# Patient Record
Sex: FEMALE | Race: WHITE | Employment: FULL TIME | URBAN - METROPOLITAN AREA
[De-identification: names, ages, dates, MRNs, and addresses within clinical notes are randomized per-mention and may not be internally consistent; named-entity substitution may affect disease eponyms.]

---

## 2017-04-12 DIAGNOSIS — B00.9 HSV (HERPES SIMPLEX VIRUS) INFECTION: ICD-10-CM

## 2017-04-12 NOTE — TELEPHONE ENCOUNTER
Reason for Call:  Medication or medication refill:    Do you use a [facility]?  Name of the pharmacy and phone number for the current request:  [facility] [address], [facility] [phone]    Name of the medication requested: Valtrex 500mg     Other request: Patient did schedule an appointment on 5/11 with Maegan Larkin for annual pap.     Can we leave a detailed message on this number? YES    Phone number patient can be reached at: Home number on file [phone] (home)    Best Time: anytime    Call taken on 4/12/2017 at 10:44 AM by Nikki Viveros

## 2017-04-14 RX ORDER — VALACYCLOVIR HYDROCHLORIDE 500 MG/1
500 TABLET, FILM COATED ORAL 2 TIMES DAILY
Qty: 24 TABLET | Refills: 0 | Status: SHIPPED | OUTPATIENT
Start: 2017-04-14 | End: 2017-05-11

## 2017-04-14 NOTE — TELEPHONE ENCOUNTER
Prescription approved per [facility].     Diana Castillo RN

## 2017-05-11 ENCOUNTER — OFFICE VISIT (OUTPATIENT)
Dept: OBGYN | Facility: CLINIC | Age: 52
End: 2017-05-11
Payer: COMMERCIAL

## 2017-05-11 VITALS
HEIGHT: 63 IN | DIASTOLIC BLOOD PRESSURE: 74 MMHG | WEIGHT: 136 LBS | BODY MASS INDEX: 24.1 KG/M2 | SYSTOLIC BLOOD PRESSURE: 132 MMHG

## 2017-05-11 DIAGNOSIS — Z12.11 SPECIAL SCREENING FOR MALIGNANT NEOPLASMS, COLON: ICD-10-CM

## 2017-05-11 DIAGNOSIS — B00.9 HSV (HERPES SIMPLEX VIRUS) INFECTION: ICD-10-CM

## 2017-05-11 DIAGNOSIS — Z01.419 ENCOUNTER FOR GYNECOLOGICAL EXAMINATION WITHOUT ABNORMAL FINDING: Primary | ICD-10-CM

## 2017-05-11 PROCEDURE — 87624 HPV HI-RISK TYP POOLED RSLT: CPT | Performed by: OBSTETRICS & GYNECOLOGY

## 2017-05-11 PROCEDURE — 87491 CHLMYD TRACH DNA AMP PROBE: CPT | Performed by: OBSTETRICS & GYNECOLOGY

## 2017-05-11 PROCEDURE — G0124 SCREEN C/V THIN LAYER BY MD: HCPCS | Performed by: OBSTETRICS & GYNECOLOGY

## 2017-05-11 PROCEDURE — 88175 CYTOPATH C/V AUTO FLUID REDO: CPT | Performed by: OBSTETRICS & GYNECOLOGY

## 2017-05-11 PROCEDURE — 87591 N.GONORRHOEAE DNA AMP PROB: CPT | Performed by: OBSTETRICS & GYNECOLOGY

## 2017-05-11 PROCEDURE — 99396 PREV VISIT EST AGE 40-64: CPT | Performed by: OBSTETRICS & GYNECOLOGY

## 2017-05-11 RX ORDER — VALACYCLOVIR HYDROCHLORIDE 500 MG/1
500 TABLET, FILM COATED ORAL DAILY
Qty: 90 TABLET | Refills: 3 | Status: ON HOLD | OUTPATIENT
Start: 2017-05-11 | End: 2018-03-15

## 2017-05-11 NOTE — MR AVS SNAPSHOT
After Visit Summary   5/11/2017    Mariely Mark    MRN: [id number]           Patient Information     Date Of Birth          1965        Visit Information        Provider Department      5/11/2017 9:30 AM Maegan Larkin MD [facility]        Today's Diagnoses     Encounter for gynecological examination without abnormal finding    -  1    Special screening for malignant neoplasms, colon        HSV (herpes simplex virus) infection           Follow-ups after your visit        Additional Services     GASTROENTEROLOGY ADULT REF PROCEDURE ONLY       Last Lab Result: No results found for: CR  Body mass index is 23.9 kg/(m^2).     Needed:  No  Language:  English    Patient will be contacted to schedule procedure.     Please be aware that coverage of these services is subject to the terms and limitations of your health insurance plan.  Call member services at your health plan with any benefit or coverage questions.  Any procedures must be performed at a [facility] facility OR coordinated by your clinic's referral office.    Please bring the following with you to your appointment:    (1) Any X-Rays, CTs or MRIs which have been performed.  Contact the facility where they were done to arrange for  prior to your scheduled appointment.    (2) List of current medications   (3) This referral request   (4) Any documents/labs given to you for this referral                  Your next 10 appointments already scheduled     Jul 18, 2017   Procedure with Jeremy Moran MD   [facility] (--)    [address]  [facility] [address] [phone]              Who to contact     If you have questions or need follow up information about today's clinic visit or your schedule please contact [facility] directly at [phone].  Normal or non-critical lab and imaging results will be communicated to you by MyChart, letter or phone  "within 4 business days after the clinic has received the results. If you do not hear from us within 7 days, please contact the clinic through [employer] or phone. If you have a critical or abnormal lab result, we will notify you by phone as soon as possible.  Submit refill requests through [employer] or call your pharmacy and they will forward the refill request to us. Please allow 3 business days for your refill to be completed.          Additional Information About Your Visit        [employer] Information     [employer] gives you secure access to your electronic health record. If you see a primary care provider, you can also send messages to your care team and make appointments. If you have questions, please call your primary care clinic.  If you do not have a primary care provider, please call [phone] and they will assist you.        Care EveryWhere ID     This is your Care EveryWhere ID. This could be used by other organizations to access your [place] medical records  [id number]        Your Vitals Were     Height Last Period BMI (Body Mass Index)             5' 3.25\" (1.607 m) 04/16/2013 23.9 kg/m2          Blood Pressure from Last 3 Encounters:   05/11/17 132/74   06/25/15 116/62   04/16/15 131/76    Weight from Last 3 Encounters:   05/11/17 136 lb (61.7 kg)   06/25/15 141 lb (64 kg)   04/16/15 139 lb (63 kg)              We Performed the Following     CHLAMYDIA TRACHOMATIS PCR     GASTROENTEROLOGY ADULT REF PROCEDURE ONLY     HPV High Risk Types DNA Cervical     NEISSERIA GONORRHOEA PCR     Pap imaged thin layer screen with HPV - recommended age 30 - 65 years (select HPV order below)          Today's Medication Changes          These changes are accurate as of: 5/11/17 11:59 PM.  If you have any questions, ask your nurse or doctor.               These medicines have changed or have updated prescriptions.        Dose/Directions    valACYclovir 500 MG tablet   Commonly known as:  VALTREX   This may have changed:  "   - when to take this  - additional instructions   Used for:  HSV (herpes simplex virus) infection   Changed by:  Maegan Larkin MD        Dose:  500 mg   Take 1 tablet (500 mg) by mouth daily   Quantity:  90 tablet   Refills:  3            Where to get your medicines      These medications were sent to [id number] - [place] - [address] AT [facility] [address], [place] [address]     Phone:  [phone]     valACYclovir 500 MG tablet                Primary Care Provider Office Phone # Fax #    Cheyenne Snider PA-C [phone]       [facility] [address] [facility] [address]        Thank you!     Thank you for choosing [facility]  for your care. Our goal is always to provide you with excellent care. Hearing back from our patients is one way we can continue to improve our services. Please take a few minutes to complete the written survey that you may receive in the mail after your visit with us. Thank you!             Your Updated Medication List - Protect others around you: Learn how to safely use, store and throw away your medicines at www.disposemymeds.org.          This list is accurate as of: 5/11/17 11:59 PM.  Always use your most recent med list.                   Brand Name Dispense Instructions for use    valACYclovir 500 MG tablet    VALTREX    90 tablet    Take 1 tablet (500 mg) by mouth daily

## 2017-05-11 NOTE — NURSING NOTE
"Chief Complaint   Patient presents with     Physical     refill of herpes medication. tongue has been numb lately, wondering if you can look at it, she's going out of town for 10 days. hot flashes.        Initial /74  Ht 5' 3.25\" (1.607 m)  Wt 136 lb (61.7 kg)  LMP 2013  BMI 23.9 kg/m2 Estimated body mass index is 23.9 kg/(m^2) as calculated from the following:    Height as of this encounter: 5' 3.25\" (1.607 m).    Weight as of this encounter: 136 lb (61.7 kg).  BP completed using cuff size: regular        The following HM Due: mammogram  pap smear  colonoscopy/FIT      The following patient reported/Care Every where data was sent to:  P ABSTRACT QUALITY INITIATIVES [[id number]]       patient has appointment for today    Shanna Yousif CMA               "

## 2017-05-11 NOTE — PROGRESS NOTES
Mariely is a 51 year old  who presents for annual exam.   Postmenopausal since .  She is having hot flashes, mild. No vaginal bleeding noted.     Besides routine health maintenance,  she would like to discuss numb tongue and hotflashes.  Sweating through her clothes. Causing disruption in her sleep, work, and social life.   GYNECOLOGIC HISTORY:  Menarche: [age]   Age at first intercourse: 17 Number of lifetime partners: <6  She is sexually active with 1 male partner(s) and she is currently in monogamous relationship.    History sexually transmitted infections:No STD history  STI testing offered?  Accepted  Estrogen replacement therapy: No  SIMONA exposure: Unknown    History of abnormal Pap smear: YES - updated in Problem List and Health Maintenance accordingly  Family history of breast CA: No  Family history of uterine/ovarian CA: No  Family history of colon CA: No    HEALTH MAINTENANCE:  Cholesterol: (No components found for: CHOL2 ) History of abnormal lipids: No  Mammo: 2013 . History of abnormal Mammo: No  Regular Self Breast Exams: No  Colonoscopy:  History of abnormal Colonoscopy: No  Dexa:  History of abnormal Dexa: No  Calcium/Vitamin D intake: source:  dairy, dietary supplement(s), diet Adequate? Yes  TSH: (No components found for: TSH1 )  Pap; (  Lab Results   Component Value Date    PAP LSIL 2015    PAP LSIL 09/10/2014    PAP LSIL 2013    )    HISTORY:  Obstetric History       T0      TAB0   SAB0   E0   M0   L0         Past Medical History:   Diagnosis Date     H/O colposcopy with cervical biopsy 13    LSIL     LSIL (low grade squamous intraepithelial lesion) on Pap smear 13     LSIL (low grade squamous intraepithelial lesion) on Pap smear 9/10/14    + HR HPV     LSIL (low grade squamous intraepithelial lesion) on Pap smear 2/24/15    + HR HPV     S/P bunionectomy 2006    L foot     No past surgical history on file.  Family History   Problem Relation Age of  "Onset     Hypertension Mother      C.A.D. Father 58     MI, stents     Hypertension Father      C.A.D. Brother 50     MI     DIABETES No family hx of      Breast Cancer No family hx of      Cancer - colorectal No family hx of      Social History     Social History     Marital status: Single     Spouse name: N/A     Number of children: N/A     Years of education: N/A     Social History Main Topics     Smoking status: Never Smoker     Smokeless tobacco: Never Used     Alcohol use Yes      Comment: 1 time per week     Drug use: No     Sexual activity: Yes     Partners: Male     Other Topics Concern     Parent/Sibling W/ Cabg, Mi Or Angioplasty Before 65f 55m? Yes     Social History Narrative       Current Outpatient Prescriptions:      valACYclovir (VALTREX) 500 MG tablet, Take 1 tablet (500 mg) by mouth 2 times daily X 3 days, Disp: 24 tablet, Rfl: 0     Allergies   Allergen Reactions     Dye [Contrast Dye] Hives     Penicillins        Past medical, surgical, social and family history were reviewed and updated in EPIC.    ROS:   C:       NEGATIVE for fever, chills, change in weight  I:         NEGATIVE for worrisome rashes, moles or lesions  E:       NEGATIVE for vision changes or irritation  E/M:   NEGATIVE for ear, mouth and throat problems  R:       NEGATIVE for significant cough or SOB  CV:     NEGATIVE for chest pain, palpitations or peripheral edema  GI:      NEGATIVE for nausea, abdominal pain, heartburn, or change in bowel habits  :    NEGATIVE for frequency, dysuria, hematuria, vaginal discharge, or bleeding  M:       NEGATIVE for significant arthralgias or myalgia  N:       NEGATIVE for weakness, dizziness or paresthesias  E:       NEGATIVE for temperature intolerance, skin/hair changes  P:       NEGATIVE for changes in mood or affect    EXAM:  /74  Ht 5' 3.25\" (1.607 m)  Wt 136 lb (61.7 kg)  LMP 04/16/2013  BMI 23.9 kg/m2   BMI: Body mass index is 23.9 kg/(m^2).  Constitutional: healthy, alert " and no distress  Head: Normocephalic. No masses, lesions, tenderness or abnormalities  Neck: Neck supple. Trachea midline. No adenopathy. Thyroid symmetric, normal size.   Cardiovascular: RRR.   Respiratory: Negative.   Breast: Breasts reveal mild symmetric fibrocystic densities, but there are no dominant, discrete, fixed or suspicious masses found.  Gastrointestinal: Abdomen soft, non-tender, non-distended. No masses, organomegaly  :  Vulva:  No external lesions, normal female hair distribution, no inguinal adenopathy.    Urethra:  Midline, non-tender, well supported, no discharge  Vagina:  Atrophic, no abnormal discharge, no lesions  Uterus:  Normal size, anteverted , non-tender, freely mobile  Ovaries:  No masses appreciated, non-tender, mobile  Rectal Exam: deferred  Musculoskeletal: extremities normal  Skin: no suspicious lesions or rashes  Psychiatric: Affect appropriate, cooperative,mentation appears normal.     COUNSELING:   Reviewed preventive health counseling, as reflected in patient instructions       Sun protection       Regular exercise       Healthy diet/nutrition       (Pauline)menopause management   reports that she has never smoked. She has never used smokeless tobacco.    Body mass index is 23.9 kg/(m^2).      FRAX Risk Assessment  ASSESSMENT:  51 year old  with satisfactory annual exam  (Z01.419) Encounter for gynecological examination without abnormal finding  (primary encounter diagnosis)  Comment:   Plan: Pap imaged thin layer screen with HPV -         recommended age 30 - 65 years (select HPV order        below), HPV High Risk Types DNA Cervical, *MA         Screening Digital Bilateral, NEISSERIA         GONORRHOEA PCR, CHLAMYDIA TRACHOMATIS PCR   Patient may decide she wants to start effexor for hot flashes. Written information provided.    (Z12.11) Special screening for malignant neoplasms, colon  Comment:   Plan: GASTROENTEROLOGY ADULT REF PROCEDURE ONLY            (B00.9) HSV  (herpes simplex virus) infection  Comment:   Plan: valACYclovir (VALTREX) 500 MG tablet

## 2017-05-12 LAB
C TRACH DNA SPEC QL NAA+PROBE: NORMAL
N GONORRHOEA DNA SPEC QL NAA+PROBE: NORMAL
SPECIMEN SOURCE: NORMAL
SPECIMEN SOURCE: NORMAL

## 2017-05-16 LAB
COPATH REPORT: ABNORMAL
PAP: ABNORMAL

## 2017-05-17 LAB
FINAL DIAGNOSIS: ABNORMAL
HPV HR 12 DNA CVX QL NAA+PROBE: POSITIVE
HPV16 DNA SPEC QL NAA+PROBE: NEGATIVE
HPV18 DNA SPEC QL NAA+PROBE: NEGATIVE
SPECIMEN DESCRIPTION: ABNORMAL

## 2017-07-13 ENCOUNTER — OFFICE VISIT (OUTPATIENT)
Dept: OBGYN | Facility: CLINIC | Age: 52
End: 2017-07-13
Payer: COMMERCIAL

## 2017-07-13 VITALS
TEMPERATURE: 98.4 F | SYSTOLIC BLOOD PRESSURE: 112 MMHG | HEIGHT: 63 IN | BODY MASS INDEX: 26.19 KG/M2 | WEIGHT: 147.8 LBS | DIASTOLIC BLOOD PRESSURE: 66 MMHG

## 2017-07-13 DIAGNOSIS — R87.610 PAPANICOLAOU SMEAR OF CERVIX WITH ATYPICAL SQUAMOUS CELLS OF UNDETERMINED SIGNIFICANCE (ASC-US): Primary | ICD-10-CM

## 2017-07-13 DIAGNOSIS — R87.619 ENDOMETRIAL CELLS ON CERVICAL PAP SMEAR INCONSISTENT WITH LAST MENSTRUAL PERIOD: ICD-10-CM

## 2017-07-13 PROCEDURE — 58110 BX DONE W/COLPOSCOPY ADD-ON: CPT | Performed by: OBSTETRICS & GYNECOLOGY

## 2017-07-13 PROCEDURE — 88305 TISSUE EXAM BY PATHOLOGIST: CPT | Performed by: OBSTETRICS & GYNECOLOGY

## 2017-07-13 PROCEDURE — 88342 IMHCHEM/IMCYTCHM 1ST ANTB: CPT | Performed by: OBSTETRICS & GYNECOLOGY

## 2017-07-13 PROCEDURE — 57454 BX/CURETT OF CERVIX W/SCOPE: CPT | Performed by: OBSTETRICS & GYNECOLOGY

## 2017-07-13 NOTE — PROGRESS NOTES
Mariely Mark is a 51 year old female  who presents for initial colposcopy, referred by Dr. Larkin. Pap smear on 17 showed: ASCUS, with high risk HPV present: not 16/18 and endometrial cells. The prior pap showed LGSIL.       Patient's last menstrual period was 2013.  UPT today is not done  Patient does not smoke  Type of contraception: none  Age at first sexual intercourse: 17  Number of sexual partners (lifetime): <6  Past GYN history: Herpes and HPV  Prior cervical/vaginal disease: KRIS 1.  Prior cervical treatment: no treatment.      PROCEDURE:      Before the procedure, it was ensured that the patient was educated regarding the nature of her findings to date, the implications, and what was to be done. She has been made aware of the role of HPV, the natural history of infection, ways to minimize her future risk, the effect of HPV on the cervix, and treatment options available should they be indicated. The details of the colposcopic procedure were reviewed. All questions were answered before proceeding, and informed consent was therefore obtained.      Speculum placed in vagina and excellent visualization of cervix acheived, cervix swabbed x 3 with acetic acid solution.      FINDINGS:  Cervix: HPV effect at 6:00, biopsy taken  SCJ seen?: no     Procedure: Endometrial biopsy.   After informed consent was obtained, the cervix was cleansed with betadyne, grasped with a tenaculum.  The pipel was inserted easily and four quadrant sampling was done to very scant return of tissue.  Sample was sent for pathology.    Tenaculum removed, hemostasis achieved with simple pressure, minimal bleeding. Patient tolerated procedure well.    ECC done?: Yes   Satisfactory examination?: no      ASSESSMENT: HPV related changes.  PLAN: specimens labelled and sent to Pathology, will base further treatment on Pathology findings, post biopsy instructions given to patient and call to discuss Pathology results      Maegan  Chaya ASHLEY

## 2017-07-13 NOTE — NURSING NOTE
"Chief Complaint   Patient presents with     Colposcopy     Gyn Exam     EMB        Initial /66  Temp 98.4  F (36.9  C) (Oral)  Ht 5' 3.25\" (1.607 m)  Wt 147 lb 12.8 oz (67 kg)  LMP 2013  BMI 25.98 kg/m2 Estimated body mass index is 25.98 kg/(m^2) as calculated from the following:    Height as of this encounter: 5' 3.25\" (1.607 m).    Weight as of this encounter: 147 lb 12.8 oz (67 kg).  BP completed using cuff size: regular        The following HM Due: NONE      The following patient reported/Care Every where data was sent to:  P ABSTRACT QUALITY INITIATIVES [[id number]]       patient has appointment for today    Shanna Yousif CMA               "

## 2017-07-13 NOTE — MR AVS SNAPSHOT
After Visit Summary   7/13/2017    Mariely Mrak    MRN: [id number]           Patient Information     Date Of Birth          1965        Visit Information        Provider Department      7/13/2017 2:00 PM Maegan Larkin MD; NE PROC RM OB/COLP [facility]        Today's Diagnoses     Papanicolaou smear of cervix with atypical squamous cells of undetermined significance (ASC-US)    -  1    Endometrial cells on cervical Pap smear inconsistent with last menstrual period           Follow-ups after your visit        Your next 10 appointments already scheduled     Jul 18, 2017   Procedure with Jeremy Moran MD   [facility] [place])    [address]  [facility] [address] [phone]              Who to contact     If you have questions or need follow up information about today's clinic visit or your schedule please contact [facility] directly at [phone].  Normal or non-critical lab and imaging results will be communicated to you by MyChart, letter or phone within 4 business days after the clinic has received the results. If you do not hear from us within 7 days, please contact the clinic through [facility] or phone. If you have a critical or abnormal lab result, we will notify you by phone as soon as possible.  Submit refill requests through [employer] or call your pharmacy and they will forward the refill request to us. Please allow 3 business days for your refill to be completed.          Additional Information About Your Visit        [facility] Information     [employer] gives you secure access to your electronic health record. If you see a primary care provider, you can also send messages to your care team and make appointments. If you have questions, please call your primary care clinic.  If you do not have a primary care provider, please call [phone] and they will assist you.        Care EveryWhere ID     This is your  "Care EveryWhere ID. This could be used by other organizations to access your [place] medical records  [id number]        Your Vitals Were     Temperature Height Last Period BMI (Body Mass Index)          98.4  F (36.9  C) (Oral) 5' 3.25\" (1.607 m) 04/16/2013 25.98 kg/m2         Blood Pressure from Last 3 Encounters:   07/13/17 112/66   05/11/17 132/74   06/25/15 116/62    Weight from Last 3 Encounters:   07/13/17 147 lb 12.8 oz (67 kg)   05/11/17 136 lb (61.7 kg)   06/25/15 141 lb (64 kg)              We Performed the Following     COLP CERVIX/UPPER VAGINA W BX CERVIX/ENDOCERV CURETT     ENDOMETRIAL BIOPSY W/O CERVICAL DILATION     Surgical pathology exam        Primary Care Provider Office Phone # Fax #    Cheyenne Michelle Snider PA-C [phone]       [address] [address]        Equal Access to Services     OSMEL ARZATE : Hadii aad ku hadasho Soomaali, waaxda luqadaha, qaybta kaalmada adeegyada, waxay idiin haytaurusn negrita álvarez . So [facility] [phone].    ATENCIÓN: Si habla español, tiene a jordan disposición servicios gratuitos de asistencia lingüística. Llame al [phone].    We comply with applicable federal civil rights laws and [place] laws. We do not discriminate on the basis of race, color, national origin, age, disability sex, sexual orientation or gender identity.            Thank you!     Thank you for choosing [facility]  for your care. Our goal is always to provide you with excellent care. Hearing back from our patients is one way we can continue to improve our services. Please take a few minutes to complete the written survey that you may receive in the mail after your visit with us. Thank you!             Your Updated Medication List - Protect others around you: Learn how to safely use, store and throw away your medicines at www.disposemymeds.org.          This list is accurate as of: 7/13/17  3:12 PM.  Always " use your most recent med list.                   Brand Name Dispense Instructions for use Diagnosis    valACYclovir 500 MG tablet    VALTREX    90 tablet    Take 1 tablet (500 mg) by mouth daily    HSV (herpes simplex virus) infection

## 2017-07-18 ENCOUNTER — HOSPITAL ENCOUNTER (OUTPATIENT)
Facility: AMBULATORY SURGERY CENTER | Age: 52
Discharge: HOME | End: 2017-07-18
Attending: SURGERY | Admitting: SURGERY
Payer: COMMERCIAL

## 2017-07-18 ENCOUNTER — SURGERY (OUTPATIENT)
Age: 52
End: 2017-07-18

## 2017-07-18 VITALS
TEMPERATURE: 97.9 F | RESPIRATION RATE: 16 BRPM | OXYGEN SATURATION: 97 % | SYSTOLIC BLOOD PRESSURE: 124 MMHG | DIASTOLIC BLOOD PRESSURE: 77 MMHG

## 2017-07-18 LAB
COLONOSCOPY: NORMAL
COPATH REPORT: NORMAL

## 2017-07-18 PROCEDURE — G0121 COLON CA SCRN NOT HI RSK IND: HCPCS | Performed by: SURGERY

## 2017-07-18 PROCEDURE — G8907 PT DOC NO EVENTS ON DISCHARG: HCPCS

## 2017-07-18 PROCEDURE — 99152 MOD SED SAME PHYS/QHP 5/>YRS: CPT | Performed by: SURGERY

## 2017-07-18 PROCEDURE — 45378 DIAGNOSTIC COLONOSCOPY: CPT

## 2017-07-18 PROCEDURE — G8918 PT W/O PREOP ORDER IV AB PRO: HCPCS

## 2017-07-18 RX ORDER — ONDANSETRON 2 MG/ML
4 INJECTION INTRAMUSCULAR; INTRAVENOUS
Status: DISCONTINUED | OUTPATIENT
Start: 2017-07-18 | End: 2017-07-19 | Stop reason: HOSPADM

## 2017-07-18 RX ORDER — LIDOCAINE 40 MG/G
CREAM TOPICAL
Status: DISCONTINUED | OUTPATIENT
Start: 2017-07-18 | End: 2017-07-19 | Stop reason: HOSPADM

## 2017-07-18 RX ORDER — FENTANYL CITRATE 50 UG/ML
INJECTION, SOLUTION INTRAMUSCULAR; INTRAVENOUS PRN
Status: DISCONTINUED | OUTPATIENT
Start: 2017-07-18 | End: 2017-07-18 | Stop reason: HOSPADM

## 2017-07-18 RX ADMIN — FENTANYL CITRATE 50 MCG: 50 INJECTION, SOLUTION INTRAMUSCULAR; INTRAVENOUS at 10:21

## 2017-07-18 RX ADMIN — FENTANYL CITRATE 100 MCG: 50 INJECTION, SOLUTION INTRAMUSCULAR; INTRAVENOUS at 10:19

## 2017-07-18 RX ADMIN — FENTANYL CITRATE 50 MCG: 50 INJECTION, SOLUTION INTRAMUSCULAR; INTRAVENOUS at 10:23

## 2017-07-19 NOTE — PROGRESS NOTES
Bernard Schuster,  Here are the results from your colposcopy.  A. Normal cervical biopsy  B. Uterine biopsy (the painful part). The endometrium looks normal. The squamous cells come from the cervical canal.   C. Cervical canal biopsy. There may be some abnormal cells here. We already did the workup (colposcopy and biopsy). These cells fell into an in-between zone. Not VERY abnormal but not normal either.    I recommend that we do a pap and take a scraping of the cervical canal again in 6 months. We don't have to do the painful part again! Just a 15 minute short office visit should do the trick.    Dr. Larkin

## 2018-01-18 ENCOUNTER — TELEPHONE (OUTPATIENT)
Dept: FAMILY MEDICINE | Facility: CLINIC | Age: 53
End: 2018-01-18

## 2018-03-14 ENCOUNTER — HOSPITAL ENCOUNTER (OUTPATIENT)
Facility: CLINIC | Age: 53
Discharge: HOME | End: 2018-03-15
Attending: EMERGENCY MEDICINE | Admitting: SURGERY
Payer: COMMERCIAL

## 2018-03-14 ENCOUNTER — RADIANT APPOINTMENT (OUTPATIENT)
Dept: GENERAL RADIOLOGY | Facility: CLINIC | Age: 53
End: 2018-03-14
Attending: FAMILY MEDICINE
Payer: COMMERCIAL

## 2018-03-14 ENCOUNTER — SURGERY (OUTPATIENT)
Age: 53
End: 2018-03-14

## 2018-03-14 ENCOUNTER — OFFICE VISIT (OUTPATIENT)
Dept: FAMILY MEDICINE | Facility: CLINIC | Age: 53
End: 2018-03-14
Payer: COMMERCIAL

## 2018-03-14 ENCOUNTER — APPOINTMENT (OUTPATIENT)
Dept: CT IMAGING | Facility: CLINIC | Age: 53
End: 2018-03-14
Attending: EMERGENCY MEDICINE
Payer: COMMERCIAL

## 2018-03-14 ENCOUNTER — ANESTHESIA (OUTPATIENT)
Dept: SURGERY | Facility: CLINIC | Age: 53
End: 2018-03-14
Payer: COMMERCIAL

## 2018-03-14 ENCOUNTER — ANESTHESIA EVENT (OUTPATIENT)
Dept: SURGERY | Facility: CLINIC | Age: 53
End: 2018-03-14
Payer: COMMERCIAL

## 2018-03-14 VITALS
DIASTOLIC BLOOD PRESSURE: 76 MMHG | TEMPERATURE: 97.9 F | HEART RATE: 82 BPM | HEIGHT: 63 IN | BODY MASS INDEX: 25.52 KG/M2 | SYSTOLIC BLOOD PRESSURE: 117 MMHG | WEIGHT: 144 LBS | OXYGEN SATURATION: 96 %

## 2018-03-14 DIAGNOSIS — R10.31 ABDOMINAL PAIN, RIGHT LOWER QUADRANT: ICD-10-CM

## 2018-03-14 DIAGNOSIS — K35.30 ACUTE APPENDICITIS WITH LOCALIZED PERITONITIS: ICD-10-CM

## 2018-03-14 DIAGNOSIS — R31.29 MICROSCOPIC HEMATURIA: ICD-10-CM

## 2018-03-14 DIAGNOSIS — R10.31 ABDOMINAL PAIN, RIGHT LOWER QUADRANT: Primary | ICD-10-CM

## 2018-03-14 DIAGNOSIS — Z11.59 NEED FOR HEPATITIS C SCREENING TEST: ICD-10-CM

## 2018-03-14 LAB
ALBUMIN SERPL-MCNC: 4.1 G/DL (ref 3.4–5)
ALBUMIN UR-MCNC: NEGATIVE MG/DL
ALP SERPL-CCNC: 86 U/L (ref 40–150)
ALT SERPL W P-5'-P-CCNC: 23 U/L (ref 0–50)
ANION GAP SERPL CALCULATED.3IONS-SCNC: 10 MMOL/L (ref 3–14)
APPEARANCE UR: CLEAR
AST SERPL W P-5'-P-CCNC: 19 U/L (ref 0–45)
BASOPHILS # BLD AUTO: 0 10E9/L (ref 0–0.2)
BASOPHILS NFR BLD AUTO: 0.3 %
BILIRUB SERPL-MCNC: 0.8 MG/DL (ref 0.2–1.3)
BILIRUB UR QL STRIP: NEGATIVE
BUN SERPL-MCNC: 18 MG/DL (ref 7–30)
CALCIUM SERPL-MCNC: 9.1 MG/DL (ref 8.5–10.1)
CHLORIDE SERPL-SCNC: 103 MMOL/L (ref 94–109)
CO2 SERPL-SCNC: 25 MMOL/L (ref 20–32)
COLOR UR AUTO: YELLOW
CREAT SERPL-MCNC: 0.82 MG/DL (ref 0.52–1.04)
DIFFERENTIAL METHOD BLD: ABNORMAL
EOSINOPHIL # BLD AUTO: 0.1 10E9/L (ref 0–0.7)
EOSINOPHIL NFR BLD AUTO: 0.6 %
ERYTHROCYTE [DISTWIDTH] IN BLOOD BY AUTOMATED COUNT: 13.6 % (ref 10–15)
GFR SERPL CREATININE-BSD FRML MDRD: 73 ML/MIN/1.7M2
GLUCOSE SERPL-MCNC: 86 MG/DL (ref 70–99)
GLUCOSE UR STRIP-MCNC: NEGATIVE MG/DL
HCT VFR BLD AUTO: 42.5 % (ref 35–47)
HGB BLD-MCNC: 14.5 G/DL (ref 11.7–15.7)
HGB UR QL STRIP: ABNORMAL
KETONES UR STRIP-MCNC: NEGATIVE MG/DL
LEUKOCYTE ESTERASE UR QL STRIP: NEGATIVE
LIPASE SERPL-CCNC: 346 U/L (ref 73–393)
LYMPHOCYTES # BLD AUTO: 0.7 10E9/L (ref 0.8–5.3)
LYMPHOCYTES NFR BLD AUTO: 9.3 %
MCH RBC QN AUTO: 31.3 PG (ref 26.5–33)
MCHC RBC AUTO-ENTMCNC: 34.1 G/DL (ref 31.5–36.5)
MCV RBC AUTO: 92 FL (ref 78–100)
MONOCYTES # BLD AUTO: 0.6 10E9/L (ref 0–1.3)
MONOCYTES NFR BLD AUTO: 8.1 %
NEUTROPHILS # BLD AUTO: 6.4 10E9/L (ref 1.6–8.3)
NEUTROPHILS NFR BLD AUTO: 81.7 %
NITRATE UR QL: NEGATIVE
NON-SQ EPI CELLS #/AREA URNS LPF: ABNORMAL /LPF
PH UR STRIP: 7.5 PH (ref 5–7)
PLATELET # BLD AUTO: 230 10E9/L (ref 150–450)
POTASSIUM SERPL-SCNC: 3.7 MMOL/L (ref 3.4–5.3)
PROT SERPL-MCNC: 8 G/DL (ref 6.8–8.8)
RBC # BLD AUTO: 4.64 10E12/L (ref 3.8–5.2)
RBC #/AREA URNS AUTO: ABNORMAL /HPF
SODIUM SERPL-SCNC: 138 MMOL/L (ref 133–144)
SOURCE: ABNORMAL
SP GR UR STRIP: 1.01 (ref 1–1.03)
UROBILINOGEN UR STRIP-ACNC: 0.2 EU/DL (ref 0.2–1)
WBC # BLD AUTO: 7.9 10E9/L (ref 4–11)
WBC #/AREA URNS AUTO: ABNORMAL /HPF

## 2018-03-14 PROCEDURE — 81001 URINALYSIS AUTO W/SCOPE: CPT | Performed by: FAMILY MEDICINE

## 2018-03-14 PROCEDURE — 37000009 ZZH ANESTHESIA TECHNICAL FEE, EACH ADDTL 15 MIN: Performed by: SURGERY

## 2018-03-14 PROCEDURE — 74019 RADEX ABDOMEN 2 VIEWS: CPT | Mod: FY

## 2018-03-14 PROCEDURE — 99214 OFFICE O/P EST MOD 30 MIN: CPT | Performed by: FAMILY MEDICINE

## 2018-03-14 PROCEDURE — 27210794 ZZH OR GENERAL SUPPLY STERILE: Performed by: SURGERY

## 2018-03-14 PROCEDURE — 99285 EMERGENCY DEPT VISIT HI MDM: CPT | Mod: Z6 | Performed by: EMERGENCY MEDICINE

## 2018-03-14 PROCEDURE — 25000125 ZZHC RX 250: Performed by: EMERGENCY MEDICINE

## 2018-03-14 PROCEDURE — 25000128 H RX IP 250 OP 636: Performed by: EMERGENCY MEDICINE

## 2018-03-14 PROCEDURE — 36000059 ZZH SURGERY LEVEL 3 EA 15 ADDTL MIN UMMC: Performed by: SURGERY

## 2018-03-14 PROCEDURE — 37000008 ZZH ANESTHESIA TECHNICAL FEE, 1ST 30 MIN: Performed by: SURGERY

## 2018-03-14 PROCEDURE — 25000128 H RX IP 250 OP 636: Performed by: NURSE ANESTHETIST, CERTIFIED REGISTERED

## 2018-03-14 PROCEDURE — 96374 THER/PROPH/DIAG INJ IV PUSH: CPT | Performed by: EMERGENCY MEDICINE

## 2018-03-14 PROCEDURE — 85025 COMPLETE CBC W/AUTO DIFF WBC: CPT | Performed by: FAMILY MEDICINE

## 2018-03-14 PROCEDURE — 36415 COLL VENOUS BLD VENIPUNCTURE: CPT | Performed by: FAMILY MEDICINE

## 2018-03-14 PROCEDURE — 71000014 ZZH RECOVERY PHASE 1 LEVEL 2 FIRST HR: Performed by: SURGERY

## 2018-03-14 PROCEDURE — 99285 EMERGENCY DEPT VISIT HI MDM: CPT | Mod: 25 | Performed by: EMERGENCY MEDICINE

## 2018-03-14 PROCEDURE — 40000170 ZZH STATISTIC PRE-PROCEDURE ASSESSMENT II: Performed by: SURGERY

## 2018-03-14 PROCEDURE — 25000128 H RX IP 250 OP 636: Performed by: ANESTHESIOLOGY

## 2018-03-14 PROCEDURE — 74176 CT ABD & PELVIS W/O CONTRAST: CPT

## 2018-03-14 PROCEDURE — 25000125 ZZHC RX 250: Performed by: SURGERY

## 2018-03-14 PROCEDURE — 80053 COMPREHEN METABOLIC PANEL: CPT | Performed by: EMERGENCY MEDICINE

## 2018-03-14 PROCEDURE — 25000566 ZZH SEVOFLURANE, EA 15 MIN: Performed by: SURGERY

## 2018-03-14 PROCEDURE — 83690 ASSAY OF LIPASE: CPT | Performed by: EMERGENCY MEDICINE

## 2018-03-14 PROCEDURE — 36000057 ZZH SURGERY LEVEL 3 1ST 30 MIN - UMMC: Performed by: SURGERY

## 2018-03-14 PROCEDURE — 88304 TISSUE EXAM BY PATHOLOGIST: CPT | Performed by: SURGERY

## 2018-03-14 PROCEDURE — 25000125 ZZHC RX 250: Performed by: NURSE ANESTHETIST, CERTIFIED REGISTERED

## 2018-03-14 PROCEDURE — 86803 HEPATITIS C AB TEST: CPT | Performed by: FAMILY MEDICINE

## 2018-03-14 RX ORDER — SODIUM CHLORIDE 9 MG/ML
1000 INJECTION, SOLUTION INTRAVENOUS CONTINUOUS
Status: DISCONTINUED | OUTPATIENT
Start: 2018-03-14 | End: 2018-03-15 | Stop reason: CLARIF

## 2018-03-14 RX ORDER — EPHEDRINE SULFATE 50 MG/ML
INJECTION, SOLUTION INTRAMUSCULAR; INTRAVENOUS; SUBCUTANEOUS PRN
Status: DISCONTINUED | OUTPATIENT
Start: 2018-03-14 | End: 2018-03-15

## 2018-03-14 RX ORDER — ONDANSETRON 2 MG/ML
4 INJECTION INTRAMUSCULAR; INTRAVENOUS EVERY 30 MIN PRN
Status: DISCONTINUED | OUTPATIENT
Start: 2018-03-14 | End: 2018-03-15 | Stop reason: HOSPADM

## 2018-03-14 RX ORDER — LIDOCAINE HYDROCHLORIDE 20 MG/ML
INJECTION, SOLUTION INFILTRATION; PERINEURAL PRN
Status: DISCONTINUED | OUTPATIENT
Start: 2018-03-14 | End: 2018-03-15

## 2018-03-14 RX ORDER — LIDOCAINE 40 MG/G
CREAM TOPICAL
Status: DISCONTINUED | OUTPATIENT
Start: 2018-03-14 | End: 2018-03-15 | Stop reason: HOSPADM

## 2018-03-14 RX ORDER — DEXAMETHASONE SODIUM PHOSPHATE 10 MG/ML
INJECTION, SOLUTION INTRAMUSCULAR; INTRAVENOUS PRN
Status: DISCONTINUED | OUTPATIENT
Start: 2018-03-14 | End: 2018-03-15

## 2018-03-14 RX ORDER — CIPROFLOXACIN 2 MG/ML
400 INJECTION, SOLUTION INTRAVENOUS ONCE
Status: COMPLETED | OUTPATIENT
Start: 2018-03-14 | End: 2018-03-15

## 2018-03-14 RX ORDER — OXYCODONE HCL 5 MG/5 ML
5 SOLUTION, ORAL ORAL EVERY 4 HOURS PRN
Status: CANCELLED | OUTPATIENT
Start: 2018-03-14

## 2018-03-14 RX ORDER — NALOXONE HYDROCHLORIDE 0.4 MG/ML
.1-.4 INJECTION, SOLUTION INTRAMUSCULAR; INTRAVENOUS; SUBCUTANEOUS
Status: CANCELLED | OUTPATIENT
Start: 2018-03-14 | End: 2018-03-15

## 2018-03-14 RX ORDER — KETOROLAC TROMETHAMINE 30 MG/ML
30 INJECTION, SOLUTION INTRAMUSCULAR; INTRAVENOUS
Status: DISCONTINUED | OUTPATIENT
Start: 2018-03-14 | End: 2018-03-15 | Stop reason: HOSPADM

## 2018-03-14 RX ORDER — SODIUM CHLORIDE, SODIUM LACTATE, POTASSIUM CHLORIDE, CALCIUM CHLORIDE 600; 310; 30; 20 MG/100ML; MG/100ML; MG/100ML; MG/100ML
INJECTION, SOLUTION INTRAVENOUS CONTINUOUS
Status: DISCONTINUED | OUTPATIENT
Start: 2018-03-14 | End: 2018-03-15 | Stop reason: HOSPADM

## 2018-03-14 RX ORDER — ONDANSETRON 4 MG/1
4 TABLET, ORALLY DISINTEGRATING ORAL EVERY 30 MIN PRN
Status: DISCONTINUED | OUTPATIENT
Start: 2018-03-14 | End: 2018-03-15 | Stop reason: HOSPADM

## 2018-03-14 RX ORDER — MEPERIDINE HYDROCHLORIDE 25 MG/ML
12.5 INJECTION INTRAMUSCULAR; INTRAVENOUS; SUBCUTANEOUS EVERY 5 MIN PRN
Status: DISCONTINUED | OUTPATIENT
Start: 2018-03-14 | End: 2018-03-15 | Stop reason: HOSPADM

## 2018-03-14 RX ORDER — SODIUM CHLORIDE, SODIUM LACTATE, POTASSIUM CHLORIDE, CALCIUM CHLORIDE 600; 310; 30; 20 MG/100ML; MG/100ML; MG/100ML; MG/100ML
INJECTION, SOLUTION INTRAVENOUS CONTINUOUS PRN
Status: DISCONTINUED | OUTPATIENT
Start: 2018-03-14 | End: 2018-03-15

## 2018-03-14 RX ORDER — ESTRADIOL 0.05 MG/D
1 PATCH, EXTENDED RELEASE TRANSDERMAL
COMMUNITY
Start: 2018-02-13 | End: 2018-11-07 | Stop reason: DRUGHIGH

## 2018-03-14 RX ORDER — FENTANYL CITRATE 50 UG/ML
25-50 INJECTION, SOLUTION INTRAMUSCULAR; INTRAVENOUS
Status: DISCONTINUED | OUTPATIENT
Start: 2018-03-14 | End: 2018-03-15 | Stop reason: HOSPADM

## 2018-03-14 RX ORDER — KETOROLAC TROMETHAMINE 30 MG/ML
30 INJECTION, SOLUTION INTRAMUSCULAR; INTRAVENOUS ONCE
Status: COMPLETED | OUTPATIENT
Start: 2018-03-14 | End: 2018-03-14

## 2018-03-14 RX ORDER — ONDANSETRON 2 MG/ML
4 INJECTION INTRAMUSCULAR; INTRAVENOUS EVERY 30 MIN PRN
Status: DISCONTINUED | OUTPATIENT
Start: 2018-03-14 | End: 2018-03-15 | Stop reason: CLARIF

## 2018-03-14 RX ORDER — PROPOFOL 10 MG/ML
INJECTION, EMULSION INTRAVENOUS PRN
Status: DISCONTINUED | OUTPATIENT
Start: 2018-03-14 | End: 2018-03-15

## 2018-03-14 RX ORDER — CITRIC ACID/SODIUM CITRATE 334-500MG
30 SOLUTION, ORAL ORAL
Status: DISCONTINUED | OUTPATIENT
Start: 2018-03-14 | End: 2018-03-15 | Stop reason: HOSPADM

## 2018-03-14 RX ORDER — ONDANSETRON 2 MG/ML
INJECTION INTRAMUSCULAR; INTRAVENOUS PRN
Status: DISCONTINUED | OUTPATIENT
Start: 2018-03-14 | End: 2018-03-15

## 2018-03-14 RX ORDER — DIMENHYDRINATE 50 MG/ML
25 INJECTION, SOLUTION INTRAMUSCULAR; INTRAVENOUS
Status: DISCONTINUED | OUTPATIENT
Start: 2018-03-14 | End: 2018-03-15 | Stop reason: HOSPADM

## 2018-03-14 RX ORDER — LABETALOL HYDROCHLORIDE 5 MG/ML
10 INJECTION, SOLUTION INTRAVENOUS
Status: DISCONTINUED | OUTPATIENT
Start: 2018-03-14 | End: 2018-03-15 | Stop reason: HOSPADM

## 2018-03-14 RX ADMIN — METRONIDAZOLE 500 MG: 500 INJECTION, SOLUTION INTRAVENOUS at 22:42

## 2018-03-14 RX ADMIN — ROCURONIUM BROMIDE 30 MG: 10 INJECTION INTRAVENOUS at 22:48

## 2018-03-14 RX ADMIN — Medication 100 MG: at 22:44

## 2018-03-14 RX ADMIN — LIDOCAINE HYDROCHLORIDE 20 ML GIVEN: 10 INJECTION, SOLUTION EPIDURAL; INFILTRATION; INTRACAUDAL; PERINEURAL at 23:44

## 2018-03-14 RX ADMIN — SODIUM CHLORIDE 1000 ML: 9 INJECTION, SOLUTION INTRAVENOUS at 15:59

## 2018-03-14 RX ADMIN — PROPOFOL 200 MG: 10 INJECTION, EMULSION INTRAVENOUS at 22:44

## 2018-03-14 RX ADMIN — PHENYLEPHRINE HYDROCHLORIDE 150 MCG: 10 INJECTION, SOLUTION INTRAMUSCULAR; INTRAVENOUS; SUBCUTANEOUS at 22:56

## 2018-03-14 RX ADMIN — ONDANSETRON 4 MG: 2 INJECTION INTRAMUSCULAR; INTRAVENOUS at 23:45

## 2018-03-14 RX ADMIN — SODIUM CHLORIDE, POTASSIUM CHLORIDE, SODIUM LACTATE AND CALCIUM CHLORIDE: 600; 310; 30; 20 INJECTION, SOLUTION INTRAVENOUS at 22:39

## 2018-03-14 RX ADMIN — Medication 5 MG: at 23:04

## 2018-03-14 RX ADMIN — Medication 5 MG: at 23:12

## 2018-03-14 RX ADMIN — LIDOCAINE HYDROCHLORIDE 100 MG: 20 INJECTION, SOLUTION INFILTRATION; PERINEURAL at 22:44

## 2018-03-14 RX ADMIN — PHENYLEPHRINE HYDROCHLORIDE 50 MCG: 10 INJECTION, SOLUTION INTRAMUSCULAR; INTRAVENOUS; SUBCUTANEOUS at 22:50

## 2018-03-14 RX ADMIN — MIDAZOLAM 2 MG: 1 INJECTION INTRAMUSCULAR; INTRAVENOUS at 22:39

## 2018-03-14 RX ADMIN — FENTANYL CITRATE 50 MCG: 50 INJECTION, SOLUTION INTRAMUSCULAR; INTRAVENOUS at 23:22

## 2018-03-14 RX ADMIN — KETOROLAC TROMETHAMINE 30 MG: 30 INJECTION, SOLUTION INTRAMUSCULAR at 15:56

## 2018-03-14 RX ADMIN — CIPROFLOXACIN 400 MG: 2 INJECTION, SOLUTION INTRAVENOUS at 22:19

## 2018-03-14 RX ADMIN — FENTANYL CITRATE 100 MCG: 50 INJECTION, SOLUTION INTRAMUSCULAR; INTRAVENOUS at 22:44

## 2018-03-14 RX ADMIN — DEXAMETHASONE SODIUM PHOSPHATE 4 MG: 10 INJECTION, SOLUTION INTRAMUSCULAR; INTRAVENOUS at 23:01

## 2018-03-14 ASSESSMENT — ENCOUNTER SYMPTOMS
NAUSEA: 1
FEVER: 0
ABDOMINAL PAIN: 1

## 2018-03-14 NOTE — LETTER
UNIT 6D OBSERVATION [facility]  [address] [place] [address]  Phone: [phone]  Fax: [phone]    March 15, 2018        Mariely Mark  [address] [place] [address]          To whom it may concern:    RE: Mariely Mark    She was admitted under our care for laparoscopic appendectomy for acute appendicitis. We would recommend her to rest at home for optimal recovery for at least a week. She is also not allowed to lift more than 10 pounds for the next 6-8 weeks.    Please facilitate her in this regard.    Thank you for your time.    Sincerely,    Dhruv Lr MD  General Surgery, PGY-1,   Pager: [phone]

## 2018-03-14 NOTE — PROGRESS NOTES
Results discussed with patient during the clinic visit.     .Caryn Ledesma MD.   Family Physician.  [facility].

## 2018-03-14 NOTE — IP AVS SNAPSHOT
Unit 6D Observation [address] [address]    Phone:  [phone]    Fax:  [phone]                                       After Visit Summary   3/14/2018    Mariely Mark    MRN: [id number]           After Visit Summary Signature Page     I have received my discharge instructions, and my questions have been answered. I have discussed any challenges I see with this plan with the nurse or doctor.    ..........................................................................................................................................  Patient/Patient Representative Signature      ..........................................................................................................................................  Patient Representative Print Name and Relationship to Patient    ..................................................               ................................................  Date                                            Time    ..........................................................................................................................................  Reviewed by Signature/Title    ...................................................              ..............................................  Date                                                            Time

## 2018-03-14 NOTE — ED NOTES
"Pt presents to ED as recommended by her primary MD to r/o appendicitis. Pt developed severe abdominal pain last night. Pt went to her doctor today and had an xray. The xray showed some \"spots\" (per patient report) and her doctor recommended she get a CT today. Pt states the pain started to her mid abdomen and has moved to RLQ. Pt is nauseated but denies vomiting. Pt is afebrile.   "

## 2018-03-14 NOTE — PROGRESS NOTES
SUBJECTIVE:   Mariely Mark is a 52 year old female who presents to clinic today for the following health issues:    ABDOMINAL PAIN     Onset: 1 day    Description:   Character: Sharp  Location:Pain started in daniel- umbilical region last night and is now in right lower quadrant   Radiation: None    Intensity: 6/10    Progression of Symptoms:  same    Accompanying Signs & Symptoms:  Fever/Chills?: no   Gas/Bloating: no   Nausea: Yes  Vomitting: no   Diarrhea?: no   Constipation:no   Dysuria or Hematuria: no    History:   Trauma: no   Previous similar pain: no    Previous tests done: none    Precipitating factors:   Does the pain change with:     Food: no      BM: no     Urination: no     Alleviating factors:  none    Therapies Tried and outcome: Pepto-bismal, gas-x    LMP:  N/A     Started in the upper part of abdomen, could not move, lasted for several hrs. She drove to ER however did not go in as her pain subsided a littlebit.   Pain radiated to right lower abdominal area this morning. Dull pain and constant.   She took peptobismol , 2 excedrin last night.     No vomiting, nausea. Lack of appetite.     She does regular exercise and her resting HR is in 60s usually, last night it went up in 90s.     Problem list and histories reviewed & adjusted, as indicated.  Additional history: as documented    Patient Active Problem List   Diagnosis     CARDIOVASCULAR SCREENING; LDL GOAL LESS THAN 160     Papanicolaou smear of cervix with low grade squamous intraepithelial lesion (LGSIL)     Family history of premature CAD     Genital herpes     Past Surgical History:   Procedure Laterality Date     COLONOSCOPY WITH CO2 INSUFFLATION N/A 7/18/2017    Procedure: COLONOSCOPY WITH CO2 INSUFFLATION;  COLON SCREEN/ RIVERS;  Surgeon: Jeremy Moran MD;  Location:  OR       Social History   Substance Use Topics     Smoking status: Never Smoker     Smokeless tobacco: Never Used     Alcohol use Yes      Comment: 1 time per  "week     Family History   Problem Relation Age of Onset     Hypertension Mother      C.A.D. Father 58     MI, stents     Hypertension Father      C.A.D. Brother 50     MI     DIABETES No family hx of      Breast Cancer No family hx of      Cancer - colorectal No family hx of          Current Outpatient Prescriptions   Medication Sig Dispense Refill     estradiol (VIVELLE-DOT) 0.05 MG/24HR BIW patch 1 patch twice a week       PROGESTERONE MICRONIZED PO Take 150 mg by mouth daily       valACYclovir (VALTREX) 500 MG tablet Take 1 tablet (500 mg) by mouth daily 90 tablet 3     Allergies   Allergen Reactions     Dye [Contrast Dye] Hives     Penicillins Hives     Recent Labs   Lab Test  07/19/13   0851   LDL  51   HDL  69   TRIG  105      BP Readings from Last 3 Encounters:   03/14/18 117/76   07/18/17 124/77   07/13/17 112/66    Wt Readings from Last 3 Encounters:   03/14/18 144 lb (65.3 kg)   07/13/17 147 lb 12.8 oz (67 kg)   05/11/17 136 lb (61.7 kg)         Labs reviewed in EPIC    Reviewed and updated as needed this visit by clinical staff  Tobacco  Allergies  Meds  Med Hx  Surg Hx  Fam Hx  Soc Hx      Reviewed and updated as needed this visit by Provider         ROS:  Constitutional, HEENT, cardiovascular, pulmonary, gi and gu systems are negative, except as otherwise noted.    OBJECTIVE:     /76 (BP Location: Left arm, Patient Position: Sitting, Cuff Size: Adult Regular)  Pulse 82  Temp 97.9  F (36.6  C) (Oral)  Ht 5' 3\" (1.6 m)  Wt 144 lb (65.3 kg)  LMP 04/16/2013  SpO2 96%  BMI 25.51 kg/m2  Body mass index is 25.51 kg/(m^2).  GENERAL: healthy, alert and no distress  RESP: lungs clear to auscultation - no rales, rhonchi or wheezes  CV: regular rate and rhythm, normal S1 S2, no S3 or S4, no murmur, click or rub, no peripheral edema and peripheral pulses strong  ABDOMEN: soft, mild epigastric tenderness, tenderness in the right paraumbilical and right lower abdominal areas and bowel sounds " normal  MS:  no edema    Results for orders placed or performed in visit on 03/14/18   Hepatitis C Screen Reflex to HCV RNA Quant and Genotype   Result Value Ref Range    Hepatitis C Antibody Nonreactive NR^Nonreactive   CBC with platelets and differential   Result Value Ref Range    WBC 7.9 4.0 - 11.0 10e9/L    RBC Count 4.64 3.8 - 5.2 10e12/L    Hemoglobin 14.5 11.7 - 15.7 g/dL    Hematocrit 42.5 35.0 - 47.0 %    MCV 92 78 - 100 fl    MCH 31.3 26.5 - 33.0 pg    MCHC 34.1 31.5 - 36.5 g/dL    RDW 13.6 10.0 - 15.0 %    Platelet Count 230 150 - 450 10e9/L    Diff Method Automated Method     % Neutrophils 81.7 %    % Lymphocytes 9.3 %    % Monocytes 8.1 %    % Eosinophils 0.6 %    % Basophils 0.3 %    Absolute Neutrophil 6.4 1.6 - 8.3 10e9/L    Absolute Lymphocytes 0.7 (L) 0.8 - 5.3 10e9/L    Absolute Monocytes 0.6 0.0 - 1.3 10e9/L    Absolute Eosinophils 0.1 0.0 - 0.7 10e9/L    Absolute Basophils 0.0 0.0 - 0.2 10e9/L   UA with Microscopic reflex to Culture   Result Value Ref Range    Color Urine Yellow     Appearance Urine Clear     Glucose Urine Negative NEG^Negative mg/dL    Bilirubin Urine Negative NEG^Negative    Ketones Urine Negative NEG^Negative mg/dL    Specific Gravity Urine 1.015 1.003 - 1.035    pH Urine 7.5 (H) 5.0 - 7.0 pH    Protein Albumin Urine Negative NEG^Negative mg/dL    Urobilinogen Urine 0.2 0.2 - 1.0 EU/dL    Nitrite Urine Negative NEG^Negative    Blood Urine Trace (A) NEG^Negative    Leukocyte Esterase Urine Negative NEG^Negative    Source Midstream Urine     WBC Urine 0 - 5 OTO5^0 - 5 /HPF    RBC Urine 2-5 (A) OTO2^O - 2 /HPF    Squamous Epithelial /LPF Urine Few FEW^Few /LPF     ABDOMEN ONE VIEW 3/14/2018 11:08 AM    HISTORY: Right-sided abdominal pain. Abdominal pain, right lower  quadrant.   COMPARISON: None    IMPRESSION: There are multiple tiny hyperdensities in the projection  of the colon, likely representing ingested material. This limits  evaluation for urinary tract calculi, but no  definite urinary tract  calculi are seen. The bowel gas pattern is nonobstructive.     FARZANA BAUTISTA MD    ASSESSMENT/PLAN:         ICD-10-CM    1. Abdominal pain, right lower quadrant R10.31 CBC with platelets and differential     UA with Microscopic reflex to Culture     XR KUB     CT Abdomen Pelvis Hematuria w/wo IV Contrast   2. Need for hepatitis C screening test Z11.59 Hepatitis C Screen Reflex to HCV RNA Quant and Genotype   3. Microscopic hematuria R31.29 CT Abdomen Pelvis Hematuria w/wo IV Contrast     Labs and imaging reviewed with pt.   Considering pt's history suspect early appendicitis. Other possibilities with UA positive for RBCs is kidney stones.   CT abd- pelvis with and w/o contrast ordered. Pt has allergy to dye. Radiologist suggested to give medrol dose pack and get imaging done in 24 hrs.   I did not feel comfortable prescribing steroids w/o knowing her Dx. Suggested ER evaluation where imaging can be done under supervision.   Pt agreeable. She will go to [facility] ER.   Case dicussed with ER physician over phone.         Caryn Ledesma MD  [facility]

## 2018-03-14 NOTE — IP AVS SNAPSHOT
MRN:[id number]                      After Visit Summary   3/14/2018    Mariely Mark    MRN: [id number]           Thank you!     Thank you for choosing [facility] for your care. Our goal is always to provide you with excellent care. Hearing back from our patients is one way we can continue to improve our services. Please take a few minutes to complete the written survey that you may receive in the mail after you visit with us. Thank you!        Patient Information     Date Of Birth          1965        About your hospital stay     You were admitted on:  March 15, 2018 You last received care in the:  [facility] Observation [facility]    You were discharged on:  March 15, 2018       Who to Call     For medical emergencies, please call 911.  For non-urgent questions about your medical care, please call your primary care provider or clinic, [phone]  For questions related to your surgery, please call your surgery clinic        Attending Provider     Provider Specialty    Oneyda Castro MD Emergency Medicine    Agueda Torres MD General Surgery       Primary Care Provider Office Phone # Fax #    Cheyenne Michelle Snider PA-C [phone]      After Care Instructions     Discharge Instructions       Patient to follow up with appointment in 4 weeks  Discharge Instructions  Activity  - No lifting, pushing, pulling more than 15-20 pounds for 3-4 weeks  - Do not operate a motor vehicle while taking narcotic pain medications    Incisions  - You may remove wound dressing 24 hours after surgery  - You may shower and get incisions wet starting 24 hrs after surgery  - Do not scrub incisions or submerge wounds (e.g. bath, pool, hot tub, etc.) for 2 weeks or until the glue or steri-strips have come off  - Avoid applying any lotions or ointments near the areas where the Dermabond glue was used    Drain Care  - You do not have a drain.  Specific care/instructions:  Not  Applicable    Medications  - Do not take any additional Tylenol (acetaminophen) while using a narcotic pain medication which includes acetaminophen  - Do not take more than 4,000mg of acetaminophen in any 24 hour period, as this can cause liver damage  - Take stool softeners such as Senna or Miralax while you are using narcotics, but stop if you develop diarrhea  - Wean yourself off of narcotic pain medications    Follow-Up:  - Call your primary care provider to touch base regarding your recent admission.     - Call or return sooner than your regularly scheduled visit if you develop any of the following: fever >101.5, uncontrolled pain, uncontrolled nausea or vomiting, as well as increased redness, swelling, or drainage from your wound.   -  A nurse from the General Surgery Clinic will contact you 24 hours, or next business day, after your discharge from the hospital.  If you have questions or to schedule a follow up appointment please call the General Surgery Clinic at [phone].  Call [phone] and ask to speak with the Surgery resident on call if you are having troubles in the evenings, at night, or on the weekend.  -  If you are receiving home care please inform your home care nurse of our contact number.            Dressing       Keep dressing clean and dry.  You have dressing over your incisions that you can take off in 3-4 days. There are strips underneath the dressing that will fall off in 1-2 weeks. No other dressing is needed.                  Follow-up Appointments     Follow Up ([facility])       Follow up with primary care provider, Cheyenne Snider, within 7 days for hospital follow- up.  No follow up labs or test are needed.    Follow up with Dr. Torres , at [facility], within 3-4 weeks  to evaluate after surgery. No follow up labs or test are needed.    Appointments on [place] and/or [facility] (with [facility] or [facility] provider or service). Call [phone] if you haven't heard regarding these  "appointments within 7 days of discharge.                  Additional Information     If you use hormonal birth control (such as the pill, patch, ring or implants): You'll need a second form of birth control for 7 days (condoms, a diaphragm or contraceptive foam). While in the hospital, you received a medicine called Bridion. Your normal birth control will not work as well for a week after taking this medicine.          Pending Results     Date and Time Order Name Status Description    3/14/2018 2340 Surgical pathology exam In process             Statement of Approval     Ordered          03/15/18 0921  I have reviewed and agree with all the recommendations and orders detailed in this document.  EFFECTIVE NOW     Approved and electronically signed by:  Dhruv Jarrell MD             Admission Information     Date & Time Provider Department Dept. Phone    3/14/2018 Agueda Torres MD Unit [facility]D Observation [facility] [place] [phone]      Your Vitals Were     Blood Pressure Pulse Temperature Respirations Height Weight    101/78 (BP Location: Right arm) 73 98.2  F (36.8  C) (Oral) 16 1.6 m (5' 3\") 66.3 kg (146 lb 1.6 oz)    Last Period Pulse Oximetry BMI (Body Mass Index)             04/16/2013 97% 25.88 kg/m2         [facility] Information     [facility] gives you secure access to your electronic health record. If you see a primary care provider, you can also send messages to your care team and make appointments. If you have questions, please call your primary care clinic.  If you do not have a primary care provider, please call [phone] and they will assist you.        Care EveryWhere ID     This is your Care EveryWhere ID. This could be used by other organizations to access your [facility] medical records  [id number]        Equal Access to Services     OSMEL ARZATE : erin Prince qaybta kaalmada adeegyada, waxay idiin hayaan adeeg kharash la'aan ah. So wa " [phone].    ATENCIÓN: Si bianca cruz, tiene a jordan disposición servicios gratuitos de asistencia lingüística. Ita gordon [phone].    We comply with applicable federal civil rights laws and [place] laws. We do not discriminate on the basis of race, color, national origin, age, disability, sex, sexual orientation, or gender identity.               Review of your medicines      START taking        Dose / Directions    acetaminophen 325 MG tablet   Commonly known as:  TYLENOL   Used for:  Acute appendicitis with localized peritonitis        Dose:  650 mg   Take 2 tablets (650 mg) by mouth every 6 hours as needed for mild pain   Quantity:  30 tablet   Refills:  0       oxyCODONE IR 5 MG tablet   Commonly known as:  ROXICODONE   Used for:  Acute appendicitis with localized peritonitis        Dose:  5 mg   Take 1 tablet (5 mg) by mouth every 4 hours as needed for pain maximum 6 tablet(s) per day   Quantity:  18 tablet   Refills:  0         CONTINUE these medicines which have NOT CHANGED        Dose / Directions    estradiol 0.05 MG/24HR BIW patch   Commonly known as:  VIVELLE-DOT        Dose:  1 patch   1 patch twice a week   Refills:  0       PROGESTERONE MICRONIZED PO        Dose:  150 mg   Take 150 mg by mouth daily   Refills:  0         STOP taking     valACYclovir 500 MG tablet   Commonly known as:  VALTREX                Where to get your medicines      These medications were sent to [facility] [place], [place] - [address]  [address] [address]     Phone:  [phone]     acetaminophen 325 MG tablet         Some of these will need a paper prescription and others can be bought over the counter. Ask your nurse if you have questions.     Bring a paper prescription for each of these medications     oxyCODONE IR 5 MG tablet                Protect others around you: Learn how to safely use, store and throw away your medicines at www.disposemymeds.org.         Information about OPIOIDS     PRESCRIPTION OPIOIDS: WHAT YOU NEED TO KNOW    Prescription opioids can be used to help relieve moderate to severe pain and are often prescribed following a surgery or injury, or for certain health conditions. These medications can be an important part of treatment but also come with serious risks. It is important to work with your health care provider to make sure you are getting the safest, most effective care.    WHAT ARE THE RISKS AND SIDE EFFECTS OF OPIOID USE?  Prescription opioids carry serious risks of addiction and overdose, especially with prolonged use. An opioid overdose, often marked by slowed breathing can cause sudden death. The use of prescription opioids can have a number of side effects as well, even when taken as directed:      Tolerance - meaning you might need to take more of a medication for the same pain relief    Physical dependence - meaning you have symptoms of withdrawal when a medication is stopped    Increased sensitivity to pain    Constipation    Nausea, vomiting, and dry mouth    Sleepiness and dizziness    Confusion    Depression    Low levels of testosterone that can result in lower sex drive, energy, and strength    Itching and sweating    RISKS ARE GREATER WITH:    History of drug misuse, substance use disorder, or overdose    Mental health conditions (such as depression or anxiety)    Sleep apnea    Older age (65 years or older)    Pregnancy    Avoid alcohol while taking prescription opioids.   Also, unless specifically advised by your health care provider, medications to avoid include:    Benzodiazepines (such as Xanax or Valium)    Muscle relaxants (such as Soma or Flexeril)    Hypnotics (such as Ambien or Lunesta)    Other prescription opioids    KNOW YOUR OPTIONS:  Talk to your health care provider about ways to manage your pain that do not involve prescription opioids. Some of these options may actually work better and have fewer risks and  side effects:    Pain relievers such as acetaminophen, ibuprofen, and naproxen    Some medications that are also used for depression or seizures    Physical therapy and exercise    Cognitive behavioral therapy, a psychological, goal-directed approach, in which patients learn how to modify physical, behavioral, and emotional triggers of pain and stress    IF YOU ARE PRESCRIBED OPIOIDS FOR PAIN:    Never take opioids in greater amounts or more often than prescribed    Follow up with your primary health care provider and work together to create a plan on how to manage your pain.    Talk about ways to help manage your pain that do not involve prescription opioids    Talk about all concerns and side effects    Help prevent misuse and abuse    Never sell or share prescription opioids    Never use another person's prescription opioids    Store prescription opioids in a secure place and out of reach of others (this may include visitors, children, friends, and family)    Visit www.cdc.gov/drugoverdose to learn about risks of opioid abuse and overdose    If you believe you may be struggling with addiction, tell your health care provider and ask for guidance or call [facility]'s National Helpline at 8-935-758-HELP    LEARN MORE / www.cdc.gov/drugoverdose/prescribing/guideline.html    Safely dispose of unused prescription opioids: Find your local drug take-back programs and more information about the importance of safe disposal at www.doseofreality.[place].gov             Medication List: This is a list of all your medications and when to take them. Check marks below indicate your daily home schedule. Keep this list as a reference.      Medications           Morning Afternoon Evening Bedtime As Needed    acetaminophen 325 MG tablet   Commonly known as:  TYLENOL   Take 2 tablets (650 mg) by mouth every 6 hours as needed for mild pain                                estradiol 0.05 MG/24HR BIW patch   Commonly known as:  VIVELLE-DOT   1  patch twice a week                                oxyCODONE IR 5 MG tablet   Commonly known as:  ROXICODONE   Take 1 tablet (5 mg) by mouth every 4 hours as needed for pain maximum 6 tablet(s) per day   Last time this was given:  10 mg on 3/15/2018  8:28 AM                                PROGESTERONE MICRONIZED PO   Take 150 mg by mouth daily

## 2018-03-14 NOTE — MR AVS SNAPSHOT
After Visit Summary   3/14/2018    Mariely Mark    MRN: [id number]           Patient Information     Date Of Birth          1965        Visit Information        Provider Department      3/14/2018 10:00 AM Caryn Ledesma MD [facility]        Today's Diagnoses     Abdominal pain, right lower quadrant    -  1    Need for hepatitis C screening test        Microscopic hematuria           Follow-ups after your visit        Your next 10 appointments already scheduled     Mar 16, 2018  9:15 AM CDT   (Arrive by 9:00 AM)   CT ABDOMEN PELVIS HEMATURIA W/O & W CONTRAST with BECT1   [facility]    [address] [id number] [phone]           Please bring any scans or X-rays taken at other hospitals, if similar tests were done. Also bring a list of your medicines, including vitamins, minerals and over-the-counter drugs. It is safest to leave personal items at home.  Be sure to tell your doctor:   If you have any allergies.   If there s any chance you are pregnant.   If you are breastfeeding.    If you have diabetes as your medication may need to be adjusted for this exam.  You will have contrast for this exam. To prepare:   Do not eat or drink for 2 hours before your exam. If you need to take medicine, you may take it with small sips of water. (We may ask you to take liquid medicine as well.)   The day before your exam, drink extra fluids at least six 8-ounce glasses (unless your doctor tells you to restrict your fluids).  Patients over 70 or patients with diabetes or kidney problems:   If you haven t had a blood test (creatinine test) within the last 30 days, the Cardiologist/Radiologist may require you to get this test prior to your exam.  Please wear loose clothing, such as a sweat suit or jogging clothes. Avoid snaps, zippers and other metal. We may ask you to undress and put on a hospital gown.  If you  "have any questions, please call the Imaging Department where you will have your exam.              Future tests that were ordered for you today     Open Future Orders        Priority Expected Expires Ordered    CT Abdomen Pelvis Hematuria w/wo IV Contrast STAT  3/14/2019 3/14/2018            Who to contact     If you have questions or need follow up information about today's clinic visit or your schedule please contact [facility] directly at [phone].  Normal or non-critical lab and imaging results will be communicated to you by [employer], letter or phone within 4 business days after the clinic has received the results. If you do not hear from us within 7 days, please contact the clinic through MyClasses or phone. If you have a critical or abnormal lab result, we will notify you by phone as soon as possible.  Submit refill requests through MyClasses or call your pharmacy and they will forward the refill request to us. Please allow 3 business days for your refill to be completed.          Additional Information About Your Visit        MyClasses Information     MyClasses gives you secure access to your electronic health record. If you see a primary care provider, you can also send messages to your care team and make appointments. If you have questions, please call your primary care clinic.  If you do not have a primary care provider, please call [phone] and they will assist you.        Care EveryWhere ID     This is your Care EveryWhere ID. This could be used by other organizations to access your [facility] medical records  [id number]        Your Vitals Were     Pulse Temperature Height Last Period Pulse Oximetry BMI (Body Mass Index)    82 97.9  F (36.6  C) (Oral) 5' 3\" (1.6 m) 04/16/2013 96% 25.51 kg/m2       Blood Pressure from Last 3 Encounters:   03/14/18 117/76   07/18/17 124/77   07/13/17 112/66    Weight from Last 3 Encounters:   03/14/18 144 lb (65.3 kg)   07/13/17 147 lb 12.8 oz (67 kg) "   05/11/17 136 lb (61.7 kg)              We Performed the Following     CBC with platelets and differential     Hepatitis C Screen Reflex to HCV RNA Quant and Genotype     UA with Microscopic reflex to Culture        Primary Care Provider Office Phone # Fax #    Cheyenne Snider PA-C [phone] [address] [facility] [address]        Equal Access to Services     [facility] CampusDEY[facility] : Hadii aad ku hadasho Soomaali, waaxda luqadaha, qaybta kaalmada adeegyada, waxay idiin hayaan adeeg kharash laeduardon ah. So [facility] [phone].    ATENCIÓN: Si habla español, tiene a jordan disposición servicios gratuitos de asistencia lingüística. Shaggyame al [phone].    We comply with applicable federal civil rights laws and [place] laws. We do not discriminate on the basis of race, color, national origin, age, disability, sex, sexual orientation, or gender identity.            Thank you!     Thank you for choosing [facility]  for your care. Our goal is always to provide you with excellent care. Hearing back from our patients is one way we can continue to improve our services. Please take a few minutes to complete the written survey that you may receive in the mail after your visit with us. Thank you!             Your Updated Medication List - Protect others around you: Learn how to safely use, store and throw away your medicines at www.disposemymeds.org.          This list is accurate as of 3/14/18 11:53 AM.  Always use your most recent med list.                   Brand Name Dispense Instructions for use Diagnosis    estradiol 0.05 MG/24HR BIW patch    VIVELLE-DOT     1 patch twice a week        PROGESTERONE MICRONIZED PO      Take 150 mg by mouth daily        valACYclovir 500 MG tablet    VALTREX    90 tablet    Take 1 tablet (500 mg) by mouth daily    HSV (herpes simplex virus) infection

## 2018-03-15 VITALS
DIASTOLIC BLOOD PRESSURE: 78 MMHG | TEMPERATURE: 98.2 F | OXYGEN SATURATION: 97 % | HEIGHT: 63 IN | HEART RATE: 73 BPM | BODY MASS INDEX: 25.89 KG/M2 | SYSTOLIC BLOOD PRESSURE: 101 MMHG | WEIGHT: 146.1 LBS | RESPIRATION RATE: 16 BRPM

## 2018-03-15 PROBLEM — K35.80 ACUTE APPENDICITIS: Status: ACTIVE | Noted: 2018-03-15

## 2018-03-15 LAB — HCV AB SERPL QL IA: NONREACTIVE

## 2018-03-15 PROCEDURE — 25000128 H RX IP 250 OP 636: Performed by: ANESTHESIOLOGY

## 2018-03-15 PROCEDURE — 25000132 ZZH RX MED GY IP 250 OP 250 PS 637: Performed by: STUDENT IN AN ORGANIZED HEALTH CARE EDUCATION/TRAINING PROGRAM

## 2018-03-15 PROCEDURE — C9399 UNCLASSIFIED DRUGS OR BIOLOG: HCPCS | Performed by: NURSE ANESTHETIST, CERTIFIED REGISTERED

## 2018-03-15 PROCEDURE — 25000128 H RX IP 250 OP 636: Performed by: NURSE ANESTHETIST, CERTIFIED REGISTERED

## 2018-03-15 RX ORDER — OXYCODONE HYDROCHLORIDE 5 MG/1
5 TABLET ORAL EVERY 4 HOURS PRN
Qty: 18 TABLET | Refills: 0 | Status: SHIPPED | OUTPATIENT
Start: 2018-03-15 | End: 2018-11-07

## 2018-03-15 RX ORDER — ACETAMINOPHEN 325 MG/1
650 TABLET ORAL EVERY 6 HOURS PRN
Qty: 30 TABLET | Refills: 0 | Status: SHIPPED | OUTPATIENT
Start: 2018-03-15 | End: 2018-11-07

## 2018-03-15 RX ORDER — HYDROMORPHONE HCL/0.9% NACL/PF 0.2MG/0.2
0.2 SYRINGE (ML) INTRAVENOUS
Status: DISCONTINUED | OUTPATIENT
Start: 2018-03-15 | End: 2018-03-15 | Stop reason: HOSPADM

## 2018-03-15 RX ORDER — OXYCODONE HYDROCHLORIDE 5 MG/1
5 TABLET ORAL EVERY 4 HOURS PRN
Qty: 18 TABLET | Refills: 0 | Status: SHIPPED | OUTPATIENT
Start: 2018-03-15 | End: 2018-03-15

## 2018-03-15 RX ORDER — OXYCODONE HYDROCHLORIDE 5 MG/1
5-10 TABLET ORAL
Qty: 12 TABLET | Refills: 0 | Status: SHIPPED | OUTPATIENT
Start: 2018-03-15 | End: 2018-03-15

## 2018-03-15 RX ORDER — ACETAMINOPHEN 325 MG/1
650 TABLET ORAL EVERY 6 HOURS PRN
Status: DISCONTINUED | OUTPATIENT
Start: 2018-03-15 | End: 2018-03-15 | Stop reason: HOSPADM

## 2018-03-15 RX ORDER — SODIUM CHLORIDE, SODIUM LACTATE, POTASSIUM CHLORIDE, CALCIUM CHLORIDE 600; 310; 30; 20 MG/100ML; MG/100ML; MG/100ML; MG/100ML
INJECTION, SOLUTION INTRAVENOUS CONTINUOUS
Status: DISCONTINUED | OUTPATIENT
Start: 2018-03-15 | End: 2018-03-15 | Stop reason: HOSPADM

## 2018-03-15 RX ORDER — ONDANSETRON 4 MG/1
4 TABLET, ORALLY DISINTEGRATING ORAL EVERY 6 HOURS PRN
Status: DISCONTINUED | OUTPATIENT
Start: 2018-03-15 | End: 2018-03-15 | Stop reason: HOSPADM

## 2018-03-15 RX ORDER — NALOXONE HYDROCHLORIDE 0.4 MG/ML
.1-.4 INJECTION, SOLUTION INTRAMUSCULAR; INTRAVENOUS; SUBCUTANEOUS
Status: DISCONTINUED | OUTPATIENT
Start: 2018-03-15 | End: 2018-03-15 | Stop reason: HOSPADM

## 2018-03-15 RX ORDER — ONDANSETRON 2 MG/ML
4 INJECTION INTRAMUSCULAR; INTRAVENOUS EVERY 6 HOURS PRN
Status: DISCONTINUED | OUTPATIENT
Start: 2018-03-15 | End: 2018-03-15 | Stop reason: HOSPADM

## 2018-03-15 RX ORDER — OXYCODONE HYDROCHLORIDE 5 MG/1
5-10 TABLET ORAL
Status: DISCONTINUED | OUTPATIENT
Start: 2018-03-15 | End: 2018-03-15 | Stop reason: HOSPADM

## 2018-03-15 RX ORDER — LIDOCAINE 40 MG/G
CREAM TOPICAL
Status: DISCONTINUED | OUTPATIENT
Start: 2018-03-15 | End: 2018-03-15 | Stop reason: HOSPADM

## 2018-03-15 RX ADMIN — OXYCODONE HYDROCHLORIDE 10 MG: 5 TABLET ORAL at 05:22

## 2018-03-15 RX ADMIN — SODIUM CHLORIDE, POTASSIUM CHLORIDE, SODIUM LACTATE AND CALCIUM CHLORIDE: 600; 310; 30; 20 INJECTION, SOLUTION INTRAVENOUS at 00:44

## 2018-03-15 RX ADMIN — OXYCODONE HYDROCHLORIDE 10 MG: 5 TABLET ORAL at 08:28

## 2018-03-15 RX ADMIN — SUGAMMADEX 120 MG: 100 INJECTION, SOLUTION INTRAVENOUS at 00:02

## 2018-03-15 RX ADMIN — OXYCODONE HYDROCHLORIDE 10 MG: 5 TABLET ORAL at 02:06

## 2018-03-15 ASSESSMENT — PAIN DESCRIPTION - DESCRIPTORS: DESCRIPTORS: ACHING

## 2018-03-15 NOTE — BRIEF OP NOTE
[facility], [place]    Brief Operative Note    Pre-operative diagnosis: appendicitis  Post-operative diagnosis * No post-op diagnosis entered *  Procedure: Procedure(s):  laparoscopic appendectomy - Wound Class: III-Contaminated  Surgeon: Surgeon(s) and Role:     * Agueda Torres MD - Primary     * Pankaj Monterroso MD - Resident - Assisting  Anesthesia: General   Estimated blood loss: 10 mL  Intraoperative intravenous fluid: 400 mL  Drains: None  Specimens:   ID Type Source Tests Collected by Time Destination   A : APPENDIX Tissue Appendix SURGICAL PATHOLOGY EXAM Agueda Torres MD 3/14/2018 11:39 PM      Findings:  Inflamed appendix with no signs of perforation  Complications: None.  Implants: None.

## 2018-03-15 NOTE — PROGRESS NOTES
Patient alert, oriented, claimed having pain at hypogastric area more to the right, PRN Oxycodone given and provided relief. Ambulated to the bathroom, voiding adequately and without difficulty. Tolerated fluids, no nausea. Lap sites, clean, dry, intact. VSS

## 2018-03-15 NOTE — PROGRESS NOTES
Dear Mariely Mark,     Your recent hepatitis C antibody test is NEGATIVE.     Caryn Ledesma MD.   Family Physician.  [facility].

## 2018-03-15 NOTE — DISCHARGE SUMMARY
[facility] Discharge Summary    Mariely Mark MRN: [id number]   YOB: 1965 Age: 52 year old     Date of Admission:  3/14/2018  Date of Discharge::  3/15/2018  Admitting Physician:  Agueda Torres MD  Discharge Physician:  Agueda Torres MD  Primary Care Physician:         Cheyenne nSider          Admission Diagnoses:   Acute appendicitis with localized peritonitis [K35.3]            Discharge Diagnosis:   Same          Procedures:   Laparoscopic appendectomy        Non-operative procedures:   None performed          Consultations:   None            Brief History of Illness:   Mariely Mark is a 52 year old previously healthy female who presents with 24 hours of acute onset abdominal pain. The patient reports that she developed a sudden onset of sharp pain in the periumbilical region last night. The pain was so severe that she was unable to move or get into a comfortable position. She initially contemplated coming to the ED but decided to try and wait out the pain. When she awoke this morning the pain had migrated to the RLQ and had transitioned to more of a dull constant ache with associated loss of appetite and nausea but no vomiting. She denies fevers, chills, night sweats, urinary symptoms, or changes in bowel movements. Her last bowel movement was this morning and was normal without blood. Mariely was seen in her PCP's office, who was concerned for appendicitis and sent her here for further evaluation.         Hospital Course:   The patient underwent the above operation on 3/14/18, which she tolerated well without complications. She was transferred to the floor for routine postoperative care and the remainder of her hospitalization was unremarkable.       At the time of discharge, she was tolerating a regular diet, ambulating, voiding spontaneously without difficulty, and pain was controlled with oral pain medications. She has adequate bowel function. The  patient was discharged home in stable and improved condition. She will follow up in clinic in 4 weeks.         Final Pathology Result:   None.         Medications Prior to Admission:     Prescriptions Prior to Admission   Medication Sig Dispense Refill Last Dose     estradiol (VIVELLE-DOT) 0.05 MG/24HR BIW patch 1 patch twice a week   Taking     PROGESTERONE MICRONIZED PO Take 150 mg by mouth daily   Taking     [DISCONTINUED] valACYclovir (VALTREX) 500 MG tablet Take 1 tablet (500 mg) by mouth daily 90 tablet 3 Taking            Discharge Medications:     Current Discharge Medication List      START taking these medications    Details   oxyCODONE IR (ROXICODONE) 5 MG tablet Take 1 tablet (5 mg) by mouth every 4 hours as needed for pain maximum 6 tablet(s) per day  Qty: 18 tablet, Refills: 0    Associated Diagnoses: Acute appendicitis with localized peritonitis      acetaminophen (TYLENOL) 325 MG tablet Take 2 tablets (650 mg) by mouth every 6 hours as needed for mild pain  Qty: 30 tablet, Refills: 0    Associated Diagnoses: Acute appendicitis with localized peritonitis         CONTINUE these medications which have NOT CHANGED    Details   estradiol (VIVELLE-DOT) 0.05 MG/24HR BIW patch 1 patch twice a week      PROGESTERONE MICRONIZED PO Take 150 mg by mouth daily         STOP taking these medications       valACYclovir (VALTREX) 500 MG tablet Comments:   Reason for Stopping:                    Day of Discharge Physical Exam:   Temp:  [97.5  F (36.4  C)-98.4  F (36.9  C)] 98.2  F (36.8  C)  Pulse:  [73-82] 73  Heart Rate:  [] 84  Resp:  [14-18] 16  BP: (101-141)/(63-83) 101/78  SpO2:  [96 %-100 %] 97 %  General: AAOx4, NAD, lying comfortably in bed  CV/Pulm: RRR, no dyspnea, NLB on RA  Abd: soft, non-distended, non-tender, dressings were dry  Extremities: WWP  Neuro: moving all extremities spontaneously          Discharge Instructions and Follow-Up:     Dressing   Keep dressing clean and dry.  You have dressing  over your incisions that you can take off in 3-4 days. There are strips underneath the dressing that will fall off in 1-2 weeks. No other dressing is needed.     Discharge Instructions   Patient to follow up with appointment in 4 weeks  Discharge Instructions  Activity  - No lifting, pushing, pulling more than 15-20 pounds for 3-4 weeks  - Do not operate a motor vehicle while taking narcotic pain medications    Incisions  - You may remove wound dressing 24 hours after surgery  - You may shower and get incisions wet starting 24 hrs after surgery  - Do not scrub incisions or submerge wounds (e.g. bath, pool, hot tub, etc.) for 2 weeks or until the glue or steri-strips have come off  - Avoid applying any lotions or ointments near the areas where the Dermabond glue was used    Drain Care  - You do not have a drain.  Specific care/instructions:  Not Applicable    Medications  - Do not take any additional Tylenol (acetaminophen) while using a narcotic pain medication which includes acetaminophen  - Do not take more than 4,000mg of acetaminophen in any 24 hour period, as this can cause liver damage  - Take stool softeners such as Senna or Miralax while you are using narcotics, but stop if you develop diarrhea  - Wean yourself off of narcotic pain medications    Follow-Up:  - Call your primary care provider to touch base regarding your recent admission.     - Call or return sooner than your regularly scheduled visit if you develop any of the following: fever >101.5, uncontrolled pain, uncontrolled nausea or vomiting, as well as increased redness, swelling, or drainage from your wound.   -  A nurse from the General Surgery Clinic will contact you 24 hours, or next business day, after your discharge from the hospital.  If you have questions or to schedule a follow up appointment please call the General Surgery Clinic at [phone].  Call [phone] and ask to speak with the Surgery resident on call if you are having  troubles in the evenings, at night, or on the weekend.  -  If you are receiving home care please inform your home care nurse of our contact number.     Follow Up ([facility])   Follow up with primary care provider, Cheyenne Snider, within 7 days for hospital follow- up.  No follow up labs or test are needed.    Follow up with Dr. Torres , at [facility], within 3-4 weeks  to evaluate after surgery. No follow up labs or test are needed.    Appointments on [place] and/or [facility] (with [facility] or [facility] provider or service). Call [phone] if you haven't heard regarding these appointments within 7 days of discharge.             Home Health Care:   Keep the incision clean and dry. When the incision and abdominal muscles ache, take a pain reliever.              Discharge Disposition:   Discharged to home      Condition at discharge: Stable      Patient discussed with staff Dr. Torres on day of discharge.      Julia Buenrostro MD  PGY-2 General Surgery Resident  [phone]

## 2018-03-15 NOTE — PROGRESS NOTES
Results discussed with patient during the clinic visit.     .Caryn Ledesma MD.   Family Physician.  [facility].

## 2018-03-15 NOTE — ANESTHESIA CARE TRANSFER NOTE
Patient: Mariely Mark    Procedure(s):  laparoscopic appendectomy - Wound Class: III-Contaminated    Diagnosis: appendicitis  Diagnosis Additional Information: No value filed.    Anesthesia Type:   RSI, ETT, General     Note:  Airway :Face Mask  Patient transferred to:PACU  Comments: Patient awake and making needs known, KVNG, report to RN upon arrival to PARHandoff Report: Identifed the Patient, Identified the Reponsible Provider, Reviewed the pertinent medical history, Discussed the surgical course, Reviewed Intra-OP anesthesia mangement and issues during anesthesia, Set expectations for post-procedure period and Allowed opportunity for questions and acknowledgement of understanding      Vitals: (Last set prior to Anesthesia Care Transfer)    CRNA VITALS  3/14/2018 2347 - 3/15/2018 0023      3/15/2018             NIBP: 135/81    Pulse: 100    SpO2: 100 %    Resp Rate (observed): 16    EKG: Sinus rhythm                Electronically Signed By: Delilah Lei CRNA, APRN CRNA  March 15, 2018  12:23 AM

## 2018-03-15 NOTE — OP NOTE
[facility], [place]    Operative Note    Pre-operative diagnosis: appendicitis   Post-operative diagnosis same   Procedure: Procedure(s):  laparoscopic appendectomy - Wound Class: III-Contaminated   Surgeon: Surgeon(s) and Role:     * Agueda Torres MD - Primary     * Pankaj Monterroso MD - Resident - Assisting   Anesthesia: General    Estimated blood loss: 10 cc   Drains: None   Specimens:   ID Type Source Tests Collected by Time Destination   A : APPENDIX Tissue Appendix SURGICAL PATHOLOGY EXAM Agueda Torres MD 3/14/2018 11:39 PM       Findings: None.   The appendix was inflamed.  It was not perforated.  There was murky fluid near the appendix.   Complications: None.   Implants: None.         OPERATIVE INDICATIONS:  Mariely Mark is a 52 year old-year-old female with a history of right lower quadrant abdominal pain. CT scan of the abdomen and pelvis showed early acute appendicitis.    After understanding the risks and benefits of proceeding with surgery, the patient has an indication for laparoscopic appendectomy and consented to undergo surgery.      OPERATIVE DETAILS:  The patient was brought to the operating room and prepared in a routine fashion.  A timeout was performed prior to surgery and documented by the nursing team.     Under the benefits of general anesthesia, a supraumbilical curvilinear skin incision was made and the edges of skin grasped with penetrating towel clamps to elevate the abdominal wall. The Veress needle was introduced and confirmed to be in good location using saline. Insufflation was established following which a 5 mm Visi-Port was inserted, and after this the 5 mm laparoscopic was advanced into the abdomen and there was no evidence of bowel injury during entry.      The patient was moved into a position with the right side up.     The cecum was identified and the appendix was identified at the confluence of the tenia  coli. There was murky fluid adjacent to the cecum and appendix.      Two additional 5 mm ports were placed in the suprapubic and left lower quadrants, after which the port at the umbilicus was up-sized to 12 mm under direct visualization.      The appendix was elevated. The appendix was dilated and hyperemic. A [place] dissector was used to create a window in the mesoappendix at its base. Next, the appendix was elevated and a blue load Endo-LLUVIA stapler was used to amputate it from the cecum. The staple line appeared hemostatic.     The mesoappendix was divided with a white load of the Endo-LLUVIA stapler.     The appendix was placed into an endocatch bag and removed from the 12mm port. The staple lines were examined and two small areas of bleeding from the staple line on the cecum were cauterized. The right lower quadrant was irrigated and hemostasis was ensured.      The 12mm incision was closed using an 0 Vicryl sutures on the PMI suture passer device under direct visualization. Local anesthesia was infiltrated into the fascia and subcutaneous tissues of the umbilical incision.     All skin incisions were closed using 4-0 vicryl suture and covered with steri strips and primapore dressings. Sponge and needle counts were correct at the conclusion of the procedure, and the patient was awakened in good condition. Dr. Torres was present and scrubbed for the critical portions of the procedure.     Pankaj Monterroso MD   Surgery PGY-3        Attending addendum:  I was present for the entirety of the procedure up to the closure of the fascia.

## 2018-03-15 NOTE — PLAN OF CARE
Problem: Patient Care Overview  Goal: Plan of Care/Patient Progress Review  Outcome: Improving  Outpatient in a Bed discharge goals PRIOR TO DISCHARGE     Comments: List all Outpatient in a Bed discharge goals to be met before discharge home:   ~ Patient able to ambulate as they were prior to admission or with assist devices provided by therapies during their stay. YES  ~ Nurse to Notify Provider when Outpatient in a Bed discharge goals have been met and patient is ready for discharge.     Pt is A&Ox4, VSS, and tolerating a regular diet. Pt is ambulating independently and has a LPIV that is saline locked because the patient is taking in adequate PO fluids. Pt has 3 laparoscopic sites on her abdomen that are clean, dry, and intact. Pt has abdominal pain managed with PRN oxycodone. Pt to d/c soon. Nurse will continue to monitor.

## 2018-03-15 NOTE — H&P
[facility]    History and Physical  General Surgery     Date of Admission:  3/14/2018    Assessment & Plan   Mariely Mark is a 52 year old previously healthy female who presents with 24 hours of periumbilical to RLQ pain with associated nausea and anorexia. CT scan is consistent with early appendicitis.    - Plan for lap appendectomy this evening  - NPO  - IV flagyl/cipro in ED  - Admit to obs general surgery post-op     Discussed with surgery staff Dr. Torres    Chief Complaint   Abdominal pain    History of Present Illness   Mariely Mark is a 52 year old previously healthy female who presents with 24 hours of acute onset abdominal pain. The patient reports that she developed a sudden onset of sharp pain in the periumbilical region last night. The pain was so severe that she was unable to move or get into a comfortable position. She initially contemplated coming to the ED but decided to try and wait out the pain. When she awoke this morning the pain had migrated to the RLQ and had transitioned to more of a dull constant ache with associated loss of appetite and nausea but no vomiting. She denies fevers, chills, night sweats, urinary symptoms, or changes in bowel movements. Her last bowel movement was this morning and was normal without blood. Mariely was seen in her PCP's office, who was concerned for appendicitis and sent her here for further evaluation.    Past Medical History    None    Past Surgical History   No prior abdominal surgery    Prior to Admission Medications   Prior to Admission Medications   Prescriptions Last Dose Informant Patient Reported? Taking?   PROGESTERONE MICRONIZED PO   Yes No   Sig: Take 150 mg by mouth daily   estradiol (VIVELLE-DOT) 0.05 MG/24HR BIW patch   Yes No   Si patch twice a week   valACYclovir (VALTREX) 500 MG tablet   No No   Sig: Take 1 tablet (500 mg) by mouth daily      Facility-Administered Medications: None     Allergies   Allergies    Allergen Reactions     Dye [Contrast Dye] Hives     Penicillins Hives       Social History   I have personally reviewed the social history with the patient showing non-smoker and occasional EtOH use.    Family History   No family history of IBD, colon cancer, or heart/lung problems     Review of Systems   CONSTITUTIONAL: NEGATIVE for fever, chills, change in weight  ENT/MOUTH: NEGATIVE for ear, mouth and throat problems  RESP: NEGATIVE for significant cough or SOB  RESP:NEGATIVE for significant cough or SOB  CV: NEGATIVE for chest pain, palpitations or peripheral edema  GI: NEGATIVE for constipation, diarrhea, dyspepsia and dysphagia  MUSCULOSKELETAL: NEGATIVE for significant arthralgias or myalgia    Physical Exam   Temp: 97.5  F (36.4  C) Temp src: Oral BP: 141/63 Pulse: 73   Resp: 16 SpO2: 100 % O2 Device: None (Room air)    Vital Signs with Ranges  Temp:  [97.5  F (36.4  C)-97.9  F (36.6  C)] 97.5  F (36.4  C)  Pulse:  [73-82] 73  Resp:  [16] 16  BP: (117-141)/(63-76) 141/63  SpO2:  [96 %-100 %] 100 %  146 lbs 1.6 oz    General: awake, alert, NAD   HEENT: no neck swelling or tenderness  Pulm: non-labored breathing on room air, clear lungs   CV: RRR  Abd: soft, focally tender in the RLQ without generalized peritonitis, non-distended  Extremities: warm, no swelling  Vascular: palpable peripheral pulses  Skin: no rashes or jaundice       Data   Results for orders placed or performed during the hospital encounter of 03/14/18 (from the past 24 hour(s))   Comprehensive metabolic panel   Result Value Ref Range    Sodium 138 133 - 144 mmol/L    Potassium 3.7 3.4 - 5.3 mmol/L    Chloride 103 94 - 109 mmol/L    Carbon Dioxide 25 20 - 32 mmol/L    Anion Gap 10 3 - 14 mmol/L    Glucose 86 70 - 99 mg/dL    Urea Nitrogen 18 7 - 30 mg/dL    Creatinine 0.82 0.52 - 1.04 mg/dL    GFR Estimate 73 >60 mL/min/1.7m2    GFR Estimate If Black 88 >60 mL/min/1.7m2    Calcium 9.1 8.5 - 10.1 mg/dL    Bilirubin Total 0.8 0.2 - 1.3 mg/dL     Albumin 4.1 3.4 - 5.0 g/dL    Protein Total 8.0 6.8 - 8.8 g/dL    Alkaline Phosphatase 86 40 - 150 U/L    ALT 23 0 - 50 U/L    AST 19 0 - 45 U/L   Lipase   Result Value Ref Range    Lipase 346 73 - 393 U/L   CT Abdomen Pelvis w/o Contrast    Narrative    Preliminary report:  This is a preliminary resident interpretation. Full report to follow.         Impression    IMPRESSION: Dilatation of the appendix at 9 mm in diameter with a mild  amount of periappendiceal stranding in the right lower quadrant,  suggesting very early uncomplicated appendicitis.      Kathy Suazo, MS3 acted as scribe.     Pankaj Monterroso MD   Surgery PGY-3

## 2018-03-15 NOTE — PROGRESS NOTES
Discharge instructions reviewed, understood, and signed by patient. VSS, PIV removed, new medications reviewed and understood, patient has all belongings. Patient awaiting transport

## 2018-03-15 NOTE — ANESTHESIA POSTPROCEDURE EVALUATION
Patient: Mariely Mark    Procedure(s):  laparoscopic appendectomy - Wound Class: III-Contaminated    Diagnosis:appendicitis  Diagnosis Additional Information: No value filed.    Anesthesia Type:  RSI, ETT, General    Note:  Anesthesia Post Evaluation    Patient location during evaluation: PACU  Patient participation: Able to fully participate in evaluation  Level of consciousness: awake and alert  Pain management: adequate  Airway patency: patent  Cardiovascular status: acceptable  Respiratory status: acceptable  Hydration status: acceptable  PONV: none             Last vitals:  Vitals:    03/14/18 1429 03/15/18 0020 03/15/18 0030   BP: 141/63 (P) 135/81 135/80   Pulse: 73     Resp: 16 16 16   Temp: 36.4  C (97.5  F) 36.7  C (98  F)    SpO2: 100%  100%         Electronically Signed By: Nikhil Alvarez MD  March 15, 2018  12:39 AM

## 2018-03-15 NOTE — ANESTHESIA PREPROCEDURE EVALUATION
Anesthesia Evaluation     . Pt has had prior anesthetic. Type: MAC           ROS/MED HX    ENT/Pulmonary:  - neg pulmonary ROS     Neurologic:  - neg neurologic ROS     Cardiovascular:  - neg cardiovascular ROS       METS/Exercise Tolerance:  >4 METS   Hematologic:  - neg hematologic  ROS       Musculoskeletal:  - neg musculoskeletal ROS       GI/Hepatic:     (+) appendicitis,       Renal/Genitourinary:  - ROS Renal section negative       Endo:  - neg endo ROS       Psychiatric:  - neg psychiatric ROS       Infectious Disease:  - neg infectious disease ROS       Malignancy:      - no malignancy   Other:    - neg other ROS                 Physical Exam  Normal systems: cardiovascular, pulmonary and dental    Airway   Mallampati: II  TM distance: >3 FB  Neck ROM: full    Dental     Cardiovascular       Pulmonary     Other findings: Very healthy appearing                Anesthesia Plan      History & Physical Review  History and physical reviewed and following examination; no interval change.    ASA Status:  1 emergent.    NPO Status:  Full stomach    Plan for RSI, ETT and General with Intravenous induction. Maintenance will be Balanced.    PONV prophylaxis:  Ondansetron (or other 5HT-3) and Dexamethasone or Solumedrol  I have examined the patient and reviewed the medical record  Patient is free of chronic disease and in excellent health  Had PO contrast within the last 2 hours.    Plan:  GA with ETT, RSI, routine monitors    Nikhil Alvarez MD      Postoperative Care  Postoperative pain management:  IV analgesics.      Consents  Anesthetic plan, risks, benefits and alternatives discussed with:  Patient..                          .

## 2018-03-16 ENCOUNTER — CARE COORDINATION (OUTPATIENT)
Dept: SURGERY | Facility: CLINIC | Age: 53
End: 2018-03-16

## 2018-03-16 NOTE — PROGRESS NOTES
Mariely Mark is a patient of Dr. Agueda Torres that underwent a laparoscopic appendectomy approximately 2 days ago.  Attempted to contact patient via telephone for a status update and review post op teaching.  LM on VM to call office.  Await return call.      Of note:  Pathology:  Pending  Wound:  Steri-strips  Follow-up:  Routine  Restrictions:  - No strenuous exercise for 3-4 weeks  - No lifting, pushing, pulling more than 15-20 pounds for 3-4 weeks  New medications:  Oxycodone, Tylenol

## 2018-03-18 ENCOUNTER — HEALTH MAINTENANCE LETTER (OUTPATIENT)
Age: 53
End: 2018-03-18

## 2018-03-19 LAB — COPATH REPORT: NORMAL

## 2018-03-19 NOTE — PROGRESS NOTES
RN Post-Op/Post-Discharge Care Coordination Note    Ms. Mariely Mark is a 52 year old female who underwent laparoscopic appendectomy with recent discharge from the hospital.  Spoke with Patient.    Support  Patient able to care for self independently     Health Status  Fevers/chills: Patient denies any fever or chills.  Nausea/Vomiting: Patient denies nausea/vomiting.  Eating/drinking: Patient is able to eat and drink without any complaints.  Reports decreased appetite.  Bowel habits: Patient reports having a normal bowel movement.  Drains (CINTHIA): N/A  Incisions: Patient denies any signs and symptoms of infection..  Wound closure:  Steri-strips   Pain: Minimal to none.  Only taking Tylenol per package instructions PRN.  Stopped narcotics after getting home.    Activity/Restrictions  No lifting in excess of 15-20 pounds for 3-4 weeks    Equipment  None    Pathology reviewed with patient:  No: Not yet available    All of her questions were answered including reviewing restrictions and wound care.  She will call this office if she has any further questions and/or concerns.      In lieu of a post-op clinic patient that patient would like to be contacted in approximately 10 days via telephone.    Whom and When to Call  Patient acknowledges understanding of how to manage any medication changes and   when to seek medical care.     Patient advised that if after hour medical concerns arise to please call [phone] and choose option 4 to speak to the physician on call.     A copy of this note was routed to the primary surgeon.    Patient will have [facility] papers faxed to this office for completion.  Out of work 3/14 - 3/22.  RTW 3/23

## 2018-03-20 ENCOUNTER — CARE COORDINATION (OUTPATIENT)
Dept: SURGERY | Facility: CLINIC | Age: 53
End: 2018-03-20

## 2018-03-20 NOTE — PROGRESS NOTES
Patient missed work starting 3/14 and is planning on RTW on 3/23 or 3/26. Fax papers to [phone] once complete.

## 2018-03-29 ENCOUNTER — CARE COORDINATION (OUTPATIENT)
Dept: SURGERY | Facility: CLINIC | Age: 53
End: 2018-03-29

## 2018-03-29 NOTE — PROGRESS NOTES
Virgen Guevara is a patient of Dr. José Muller that recently underwent a surgical procedure (lap appy).  The patient was contacted via telephone approximately 10 days ago for a status update and post-op teaching.  In lieu of a clinic visit, that patient requested to be contacted at a later date by an RN for an assessment.    Attempted to contact patient via telephone for a status update.  LM on VM to call office.    Pathology:  Copath Report 03/14/2018 11:39 PM 88   Patient Name: VIRGEN GUEVARA   MR#: [id number]   Specimen #: [id number]   Collected: 3/14/2018   Received: 3/15/2018   Reported: 3/19/2018 14:07   Ordering Phy(s): JOSÉ MULLER     For improved result formatting, select 'View Enhanced Report Format' under    Linked Documents section.     SPECIMEN(S):   Appendix     FINAL DIAGNOSIS:   APPENDIX, APPENDECTOMY:   - Acute appendicitis

## 2018-03-31 ENCOUNTER — APPOINTMENT (OUTPATIENT)
Dept: GENERAL RADIOLOGY | Facility: CLINIC | Age: 53
End: 2018-03-31
Attending: EMERGENCY MEDICINE
Payer: COMMERCIAL

## 2018-03-31 ENCOUNTER — HOSPITAL ENCOUNTER (OUTPATIENT)
Facility: CLINIC | Age: 53
Setting detail: OBSERVATION
Discharge: HOME | End: 2018-04-01
Attending: EMERGENCY MEDICINE | Admitting: FAMILY MEDICINE
Payer: COMMERCIAL

## 2018-03-31 DIAGNOSIS — R07.9 CHEST PAIN, UNSPECIFIED TYPE: ICD-10-CM

## 2018-03-31 LAB
ANION GAP SERPL CALCULATED.3IONS-SCNC: 7 MMOL/L (ref 3–14)
BASOPHILS # BLD AUTO: 0 10E9/L (ref 0–0.2)
BASOPHILS NFR BLD AUTO: 0.2 %
BUN SERPL-MCNC: 19 MG/DL (ref 7–30)
CALCIUM SERPL-MCNC: 8.9 MG/DL (ref 8.5–10.1)
CHLORIDE SERPL-SCNC: 108 MMOL/L (ref 94–109)
CO2 SERPL-SCNC: 24 MMOL/L (ref 20–32)
CREAT SERPL-MCNC: 0.99 MG/DL (ref 0.52–1.04)
D DIMER PPP FEU-MCNC: 0.4 UG/ML FEU (ref 0–0.5)
DIFFERENTIAL METHOD BLD: NORMAL
EOSINOPHIL # BLD AUTO: 0.2 10E9/L (ref 0–0.7)
EOSINOPHIL NFR BLD AUTO: 3.5 %
ERYTHROCYTE [DISTWIDTH] IN BLOOD BY AUTOMATED COUNT: 13.2 % (ref 10–15)
GFR SERPL CREATININE-BSD FRML MDRD: 59 ML/MIN/1.7M2
GLUCOSE SERPL-MCNC: 75 MG/DL (ref 70–99)
HCT VFR BLD AUTO: 38.8 % (ref 35–47)
HGB BLD-MCNC: 13.2 G/DL (ref 11.7–15.7)
IMM GRANULOCYTES # BLD: 0 10E9/L (ref 0–0.4)
IMM GRANULOCYTES NFR BLD: 0.4 %
INTERPRETATION ECG - MUSE: NORMAL
LYMPHOCYTES # BLD AUTO: 1.2 10E9/L (ref 0.8–5.3)
LYMPHOCYTES NFR BLD AUTO: 21.5 %
MCH RBC QN AUTO: 31.1 PG (ref 26.5–33)
MCHC RBC AUTO-ENTMCNC: 34 G/DL (ref 31.5–36.5)
MCV RBC AUTO: 92 FL (ref 78–100)
MONOCYTES # BLD AUTO: 0.5 10E9/L (ref 0–1.3)
MONOCYTES NFR BLD AUTO: 9.7 %
NEUTROPHILS # BLD AUTO: 3.5 10E9/L (ref 1.6–8.3)
NEUTROPHILS NFR BLD AUTO: 64.7 %
NRBC # BLD AUTO: 0 10*3/UL
NRBC BLD AUTO-RTO: 0 /100
PLATELET # BLD AUTO: 220 10E9/L (ref 150–450)
POTASSIUM SERPL-SCNC: 3.4 MMOL/L (ref 3.4–5.3)
RBC # BLD AUTO: 4.24 10E12/L (ref 3.8–5.2)
SODIUM SERPL-SCNC: 140 MMOL/L (ref 133–144)
TROPONIN I SERPL-MCNC: <0.015 UG/L (ref 0–0.04)
WBC # BLD AUTO: 5.4 10E9/L (ref 4–11)

## 2018-03-31 PROCEDURE — 99219 ZZC INITIAL OBSERVATION CARE,LEVL II: CPT | Mod: Z6 | Performed by: NURSE PRACTITIONER

## 2018-03-31 PROCEDURE — 80048 BASIC METABOLIC PNL TOTAL CA: CPT | Performed by: EMERGENCY MEDICINE

## 2018-03-31 PROCEDURE — 84484 ASSAY OF TROPONIN QUANT: CPT | Performed by: EMERGENCY MEDICINE

## 2018-03-31 PROCEDURE — 85379 FIBRIN DEGRADATION QUANT: CPT | Performed by: EMERGENCY MEDICINE

## 2018-03-31 PROCEDURE — 25000132 ZZH RX MED GY IP 250 OP 250 PS 637: Performed by: EMERGENCY MEDICINE

## 2018-03-31 PROCEDURE — 71046 X-RAY EXAM CHEST 2 VIEWS: CPT

## 2018-03-31 PROCEDURE — 93005 ELECTROCARDIOGRAM TRACING: CPT | Performed by: EMERGENCY MEDICINE

## 2018-03-31 PROCEDURE — 99285 EMERGENCY DEPT VISIT HI MDM: CPT | Mod: 25 | Performed by: EMERGENCY MEDICINE

## 2018-03-31 PROCEDURE — 93010 ELECTROCARDIOGRAM REPORT: CPT | Mod: Z6 | Performed by: EMERGENCY MEDICINE

## 2018-03-31 PROCEDURE — 85025 COMPLETE CBC W/AUTO DIFF WBC: CPT | Performed by: EMERGENCY MEDICINE

## 2018-03-31 RX ORDER — NITROGLYCERIN 0.4 MG/1
0.4 TABLET SUBLINGUAL EVERY 5 MIN PRN
Status: DISCONTINUED | OUTPATIENT
Start: 2018-03-31 | End: 2018-04-01 | Stop reason: HOSPADM

## 2018-03-31 RX ADMIN — NITROGLYCERIN 0.4 MG: 0.4 TABLET SUBLINGUAL at 23:16

## 2018-03-31 RX ADMIN — NITROGLYCERIN 0.4 MG: 0.4 TABLET SUBLINGUAL at 22:37

## 2018-03-31 ASSESSMENT — ENCOUNTER SYMPTOMS
AGITATION: 0
NAUSEA: 0
DIFFICULTY URINATING: 0
COUGH: 0
VOMITING: 0
BACK PAIN: 0
DIARRHEA: 0
LIGHT-HEADEDNESS: 0
FEVER: 0
ABDOMINAL PAIN: 0
SHORTNESS OF BREATH: 1
POLYDIPSIA: 0
ADENOPATHY: 0
NECK STIFFNESS: 0
DIAPHORESIS: 0
COLOR CHANGE: 0
NECK PAIN: 0

## 2018-03-31 NOTE — IP AVS SNAPSHOT
MRN:[id number]                      After Visit Summary   3/31/2018    Mariely Mark    MRN: [id number]           Thank you!     Thank you for choosing [facility] for your care. Our goal is always to provide you with excellent care. Hearing back from our patients is one way we can continue to improve our services. Please take a few minutes to complete the written survey that you may receive in the mail after you visit with us. Thank you!        Patient Information     Date Of Birth          1965        About your hospital stay     You were admitted on:  April 1, 2018 You last received care in the:  [facility]    You were discharged on:  April 1, 2018        Reason for your hospital stay       Chest pain                  Who to Call     For medical emergencies, please call 911.  For non-urgent questions about your medical care, please call your primary care provider or clinic, [phone]          Attending Provider     Provider Specialty    Trinh Horner MD Emergency Medicine    Omar, Ginette Antonio MD Emergency Medicine    Mark, Napoleon Giraldo MD Emergency Medicine       Primary Care Provider Office Phone # Fax #    [place] Michelle Snider PA-C [phone]       When to contact your care team       Please go to your nearest emergency room If you were to have a change in the type of chest pain i.e more severe, lasting longer or radiating to your shoulder, arm, neck, jaw or back, shortness of breath or increased pain with breathing, coughing up blood, feel dizzy or lightheaded, or notice swelling in one leg.                  After Care Instructions     Activity       Your activity upon discharge: As tolerated            Diet       Follow this diet upon discharge: Regular diet            Discharge Instructions       The chest pain you came into the emergency room for does not appear to be cardiac chest pain. Your heart enzymes were normal which tells us there  "was no damage to the heart muscle. Your stress test was normal. Your V/Q scan checking for blood clots in your lungs and this was negative.     Trial Prilosec over the counter for 1-2 weeks and follow up with your primary care doctor next week.     Continue to drink plenty of fluids.                  Follow-up Appointments     Adult [facility] Follow-up and recommended labs and tests       Follow up with PCP in one week, return to the ER if having chest pain, syncope, shortness of breath or fever greater than 101F.     Appointments on [facility] and/or [facility] (with [facility] or [facility] provider or service). Call [phone] if you haven't heard regarding these appointments within 7 days of discharge.                  Pending Results     No orders found for last 3 day(s).            Statement of Approval     Ordered          04/01/18 1303  I have reviewed and agree with all the recommendations and orders detailed in this document.  EFFECTIVE NOW     Approved and electronically signed by:  Stacey Chacon APRN CNP             Admission Information     Date & Time Provider Department Dept. Phone    3/31/2018 Napoleon Flores MD Unit [facility] [place] [phone]      Your Vitals Were     Blood Pressure Pulse Temperature Respirations Height Weight    108/67 92 97.7  F (36.5  C) (Oral) 16 1.6 m (5' 3\") 66.5 kg (146 lb 9.6 oz)    Last Period Pulse Oximetry BMI (Body Mass Index)             04/16/2013 97% 25.97 kg/m2         [employer] Information     [employer] gives you secure access to your electronic health record. If you see a primary care provider, you can also send messages to your care team and make appointments. If you have questions, please call your primary care clinic.  If you do not have a primary care provider, please call [phone] and they will assist you.        Care EveryWhere ID     This is your Care EveryWhere ID. This could be used by other organizations to access your [facility] " records  [id number]        Equal Access to Services     [facility] : Hadii aad ku hadmateojaneen Dominique, waaxda lugenevaadaha, qaybta rachaelmanico velasquez, thalia englenathanielinez chawla. So [facility] [phone].    ATENCIÓN: Si habla español, tiene a jordan disposición servicios gratuitos de asistencia lingüística. Llame al [phone].    We comply with applicable federal civil rights laws and [place] laws. We do not discriminate on the basis of race, color, national origin, age, disability, sex, sexual orientation, or gender identity.               Review of your medicines      CONTINUE these medicines which have NOT CHANGED        Dose / Directions    acetaminophen 325 MG tablet   Commonly known as:  TYLENOL   Used for:  Acute appendicitis with localized peritonitis        Dose:  650 mg   Take 2 tablets (650 mg) by mouth every 6 hours as needed for mild pain   Quantity:  30 tablet   Refills:  0       estradiol 0.05 MG/24HR BIW patch   Commonly known as:  VIVELLE-DOT        Dose:  1 patch   1 patch twice a week   Refills:  0       oxyCODONE IR 5 MG tablet   Commonly known as:  ROXICODONE   Used for:  Acute appendicitis with localized peritonitis        Dose:  5 mg   Take 1 tablet (5 mg) by mouth every 4 hours as needed for pain maximum 6 tablet(s) per day   Quantity:  18 tablet   Refills:  0       PROGESTERONE MICRONIZED PO        Dose:  150 mg   Take 150 mg by mouth daily   Refills:  0                Protect others around you: Learn how to safely use, store and throw away your medicines at www.disposemymeds.org.             Medication List: This is a list of all your medications and when to take them. Check marks below indicate your daily home schedule. Keep this list as a reference.      Medications           Morning Afternoon Evening Bedtime As Needed    acetaminophen 325 MG tablet   Commonly known as:  TYLENOL   Take 2 tablets (650 mg) by mouth every 6 hours as needed for mild pain                                 estradiol 0.05 MG/24HR BIW patch   Commonly known as:  VIVELLE-DOT   1 patch twice a week                                oxyCODONE IR 5 MG tablet   Commonly known as:  ROXICODONE   Take 1 tablet (5 mg) by mouth every 4 hours as needed for pain maximum 6 tablet(s) per day                                PROGESTERONE MICRONIZED PO   Take 150 mg by mouth daily

## 2018-03-31 NOTE — IP AVS SNAPSHOT
[facility] [address] Observation [address] [address]    Phone:  [phone]    Fax:  [phone]                                       After Visit Summary   3/31/2018    Mariely Mark    MRN: [id number]           After Visit Summary Signature Page     I have received my discharge instructions, and my questions have been answered. I have discussed any challenges I see with this plan with the nurse or doctor.    ..........................................................................................................................................  Patient/Patient Representative Signature      ..........................................................................................................................................  Patient Representative Print Name and Relationship to Patient    ..................................................               ................................................  Date                                            Time    ..........................................................................................................................................  Reviewed by Signature/Title    ...................................................              ..............................................  Date                                                            Time

## 2018-04-01 ENCOUNTER — APPOINTMENT (OUTPATIENT)
Dept: CARDIOLOGY | Facility: CLINIC | Age: 53
End: 2018-04-01
Attending: NURSE PRACTITIONER
Payer: COMMERCIAL

## 2018-04-01 ENCOUNTER — APPOINTMENT (OUTPATIENT)
Dept: NUCLEAR MEDICINE | Facility: CLINIC | Age: 53
End: 2018-04-01
Payer: COMMERCIAL

## 2018-04-01 VITALS
DIASTOLIC BLOOD PRESSURE: 67 MMHG | BODY MASS INDEX: 25.98 KG/M2 | OXYGEN SATURATION: 97 % | WEIGHT: 146.6 LBS | HEART RATE: 92 BPM | RESPIRATION RATE: 16 BRPM | SYSTOLIC BLOOD PRESSURE: 108 MMHG | TEMPERATURE: 97.7 F | HEIGHT: 63 IN

## 2018-04-01 LAB
TROPONIN I SERPL-MCNC: <0.015 UG/L (ref 0–0.04)
TROPONIN I SERPL-MCNC: <0.015 UG/L (ref 0–0.04)

## 2018-04-01 PROCEDURE — 84484 ASSAY OF TROPONIN QUANT: CPT | Mod: 91 | Performed by: NURSE PRACTITIONER

## 2018-04-01 PROCEDURE — 99217 ZZC OBSERVATION CARE DISCHARGE: CPT | Mod: Z6 | Performed by: NURSE PRACTITIONER

## 2018-04-01 PROCEDURE — 34300033 ZZH RX 343: Performed by: FAMILY MEDICINE

## 2018-04-01 PROCEDURE — 25500064 ZZH RX 255 OP 636: Performed by: INTERNAL MEDICINE

## 2018-04-01 PROCEDURE — A9567 TECHNETIUM TC-99M AEROSOL: HCPCS | Performed by: FAMILY MEDICINE

## 2018-04-01 PROCEDURE — 93018 CV STRESS TEST I&R ONLY: CPT | Performed by: INTERNAL MEDICINE

## 2018-04-01 PROCEDURE — 93350 STRESS TTE ONLY: CPT | Mod: 26 | Performed by: INTERNAL MEDICINE

## 2018-04-01 PROCEDURE — 93017 CV STRESS TEST TRACING ONLY: CPT

## 2018-04-01 PROCEDURE — 36415 COLL VENOUS BLD VENIPUNCTURE: CPT | Performed by: NURSE PRACTITIONER

## 2018-04-01 PROCEDURE — G0378 HOSPITAL OBSERVATION PER HR: HCPCS

## 2018-04-01 PROCEDURE — 27210210 NM LUNG SCAN VENTILATION AND PERFUSION

## 2018-04-01 PROCEDURE — A9540 TC99M MAA: HCPCS | Performed by: FAMILY MEDICINE

## 2018-04-01 PROCEDURE — 93016 CV STRESS TEST SUPVJ ONLY: CPT | Performed by: INTERNAL MEDICINE

## 2018-04-01 PROCEDURE — 93325 DOPPLER ECHO COLOR FLOW MAPG: CPT | Mod: 26 | Performed by: INTERNAL MEDICINE

## 2018-04-01 PROCEDURE — 93321 DOPPLER ECHO F-UP/LMTD STD: CPT | Mod: 26 | Performed by: INTERNAL MEDICINE

## 2018-04-01 RX ORDER — ALUMINA, MAGNESIA, AND SIMETHICONE 2400; 2400; 240 MG/30ML; MG/30ML; MG/30ML
30 SUSPENSION ORAL EVERY 4 HOURS PRN
Status: DISCONTINUED | OUTPATIENT
Start: 2018-04-01 | End: 2018-04-01 | Stop reason: HOSPADM

## 2018-04-01 RX ORDER — ASPIRIN 81 MG/1
81 TABLET ORAL DAILY
Status: DISCONTINUED | OUTPATIENT
Start: 2018-04-02 | End: 2018-04-01 | Stop reason: HOSPADM

## 2018-04-01 RX ORDER — LIDOCAINE 40 MG/G
CREAM TOPICAL
Status: DISCONTINUED | OUTPATIENT
Start: 2018-04-01 | End: 2018-04-01 | Stop reason: HOSPADM

## 2018-04-01 RX ORDER — NALOXONE HYDROCHLORIDE 0.4 MG/ML
.1-.4 INJECTION, SOLUTION INTRAMUSCULAR; INTRAVENOUS; SUBCUTANEOUS
Status: DISCONTINUED | OUTPATIENT
Start: 2018-04-01 | End: 2018-04-01 | Stop reason: HOSPADM

## 2018-04-01 RX ORDER — ACETAMINOPHEN 325 MG/1
650 TABLET ORAL EVERY 4 HOURS PRN
Status: DISCONTINUED | OUTPATIENT
Start: 2018-04-01 | End: 2018-04-01 | Stop reason: HOSPADM

## 2018-04-01 RX ADMIN — Medication 6.5 MILLICURIE: at 11:49

## 2018-04-01 RX ADMIN — KIT FOR THE PREPARATION OF TECHNETIUM TC 99M PENTETATE 2 MILLICURIE: 20 INJECTION, POWDER, LYOPHILIZED, FOR SOLUTION INTRAVENOUS; RESPIRATORY (INHALATION) at 11:49

## 2018-04-01 RX ADMIN — PERFLUTREN 5 ML: 6.52 INJECTION, SUSPENSION INTRAVENOUS at 10:24

## 2018-04-01 NOTE — PROGRESS NOTES
"S: Patient in the ER feeling comfortable at this point.  Chest discomfort improved.  No other complaints noted this point.  Patient's exam in general  O:/67  Pulse 92  Temp 97.7  F (36.5  C) (Oral)  Resp 16  Ht 1.6 m (5' 3\")  Wt 66.5 kg (146 lb 9.6 oz)  LMP 04/16/2013  SpO2 97%  BMI 25.97 kg/m2  No distress noted unremarkable at this point.  A: Left sharp chest pain unclear etiology  P: Stress echo plan this morning troponins were negative otherwise GE reflux may be because patient history of DVT in the past recent surgery and travel even though d-dimer negative we did order a VQ scan patient has contrast allergy otherwise.  If these are negative reassurance treat for possible GERD.    "

## 2018-04-01 NOTE — DISCHARGE SUMMARY
Discharge Summary    Mariely Mark MRN# [id number]   YOB: 1965 Age: 52 year old     Date of Admission:  3/31/2018  Date of Discharge:  4/1/2018  Admitting Physician:  Napoleon Flores MD  Discharge Physician:  Napoleon Flores MD   Discharging Service:  Emergency Department Observation Unit     Primary Provider: Cheyenne Snider          Discharge Diagnosis:     * No active hospital problems. *    * No resolved hospital problems. *               Discharge Disposition:   Discharged to home           Condition on Discharge:   Discharge condition: Stable   Code status on discharge: Full Code           Procedures:   Stress test, chest pain and V/Q scan          Discharge Medications:     Current Discharge Medication List      CONTINUE these medications which have NOT CHANGED    Details   acetaminophen (TYLENOL) 325 MG tablet Take 2 tablets (650 mg) by mouth every 6 hours as needed for mild pain  Qty: 30 tablet, Refills: 0    Associated Diagnoses: Acute appendicitis with localized peritonitis      oxyCODONE IR (ROXICODONE) 5 MG tablet Take 1 tablet (5 mg) by mouth every 4 hours as needed for pain maximum 6 tablet(s) per day  Qty: 18 tablet, Refills: 0    Associated Diagnoses: Acute appendicitis with localized peritonitis      estradiol (VIVELLE-DOT) 0.05 MG/24HR BIW patch 1 patch twice a week      PROGESTERONE MICRONIZED PO Take 150 mg by mouth daily                   Consultations:   No consultations were requested during this admission             Brief History of Illness:   Mariely Mark is a 52 year old female with a history notable for recent appendicitis s/p uncomplicated appendectomy on 3/14/18 and familial history of premature CAD who presents to the ED for the evaluation of chest pain.          Hospital Course:   - Sudden onset of chest pain last evening  - EKG WNL, normal sinus.   - Serial troponins negative.   - Stress test negative  - Patient will trial Prilosec OTC.   -  CXR WNL,  "d-dimer WNL. Hx of blood clot several years ago, opted to do V/Q scan which was negative.  - Patient was stable on discharge and agreed to discharge plan.     # Recent hx append            Final Day of Progress before Discharge:       Physical Exam:  Blood pressure 108/67, pulse 92, temperature 97.7  F (36.5  C), temperature source Oral, resp. rate 16, height 1.6 m (5' 3\"), weight 66.5 kg (146 lb 9.6 oz), last menstrual period 04/16/2013, SpO2 97 %, not currently breastfeeding.  Constitutional: healthy, alert and no distress   Head: Normocephalic. No masses, lesions, tenderness or abnormalities   Neck: Neck supple. No adenopathy. Thyroid symmetric, normal size,, Carotids without bruits.   ENT: ENT exam normal, no neck nodes or sinus tenderness   Cardiovascular: RRR. No murmurs, clicks gallops or rub   Respiratory: . Good diaphragmatic excursion. Lungs clear   Gastrointestinal: Abdomen soft,. BS normal. No masses, organomegaly.   : Deferred   Musculoskeletal: extremities normal- no gross deformities noted, gait normal and normal muscle tone   Skin: no suspicious lesions or rashes   Neurologic: Gait normal. Reflexes normal and symmetric. Sensation grossly WNL.   Psychiatric: mentation appears normal and affect normal/bright   Hematologic/Lymphatic/Immunologic: normal ant/post cervical, axillary, supraclavicular and inguinal   EXAM:            Data:  All laboratory data reviewed             Significant Results:   None  Results for orders placed or performed during the hospital encounter of 03/31/18   XR Chest 2 Views    Narrative    XR CHEST 2 VW  3/31/2018 11:34 PM      HISTORY: chest pain;     COMPARISON: None    FINDINGS: Cardiac silhouette is within normal limits. No pleural  effusion or pneumothorax. No focal airspace opacity.      Impression    IMPRESSION: Clear chest    I have personally reviewed the examination and initial interpretation  and I agree with the findings.    IGNACIO BARRIGA MD   NM Lung Scan " Ventilation and Perfusion    Narrative    Examination:NM LUNG SCAN VENTILATION AND PERFUSION, 4/1/2018 12:24 PM     Indication:  Rule out pulmonary embolism.     Additional Information: none    Technique:    The patient received 2 mCi of Tc-99m DTPA aerosol for ventilation and  6.5 mCi of Tc-99m MAA for perfusion. Multiple images were obtained of  the lungs in Anterior, posterior, MOTA, RPO, LPO, and  Amharic projections.    Comparison: Radiograph 3/31/2019    Findings:    The ventilation study demonstrates no focal ventilation defects.    The perfusion study demonstrates no focal perfusion defects.      Impression    Impression: Pulmonary emboli are not present.    I have personally reviewed the examination and initial interpretation  and I agree with the findings.    IGNACIO BARRIGA MD   CBC with platelets differential   Result Value Ref Range    WBC 5.4 4.0 - 11.0 10e9/L    RBC Count 4.24 3.8 - 5.2 10e12/L    Hemoglobin 13.2 11.7 - 15.7 g/dL    Hematocrit 38.8 35.0 - 47.0 %    MCV 92 78 - 100 fl    MCH 31.1 26.5 - 33.0 pg    MCHC 34.0 31.5 - 36.5 g/dL    RDW 13.2 10.0 - 15.0 %    Platelet Count 220 150 - 450 10e9/L    Diff Method Automated Method     % Neutrophils 64.7 %    % Lymphocytes 21.5 %    % Monocytes 9.7 %    % Eosinophils 3.5 %    % Basophils 0.2 %    % Immature Granulocytes 0.4 %    Nucleated RBCs 0 0 /100    Absolute Neutrophil 3.5 1.6 - 8.3 10e9/L    Absolute Lymphocytes 1.2 0.8 - 5.3 10e9/L    Absolute Monocytes 0.5 0.0 - 1.3 10e9/L    Absolute Eosinophils 0.2 0.0 - 0.7 10e9/L    Absolute Basophils 0.0 0.0 - 0.2 10e9/L    Abs Immature Granulocytes 0.0 0 - 0.4 10e9/L    Absolute Nucleated RBC 0.0    Basic metabolic panel   Result Value Ref Range    Sodium 140 133 - 144 mmol/L    Potassium 3.4 3.4 - 5.3 mmol/L    Chloride 108 94 - 109 mmol/L    Carbon Dioxide 24 20 - 32 mmol/L    Anion Gap 7 3 - 14 mmol/L    Glucose 75 70 - 99 mg/dL    Urea Nitrogen 19 7 - 30 mg/dL    Creatinine 0.99 0.52 - 1.04 mg/dL     GFR Estimate 59 (L) >60 mL/min/1.7m2    GFR Estimate If Black 71 >60 mL/min/1.7m2    Calcium 8.9 8.5 - 10.1 mg/dL   Troponin I   Result Value Ref Range    Troponin I ES <0.015 0.000 - 0.045 ug/L   D dimer quantitative   Result Value Ref Range    D Dimer 0.4 0.0 - 0.50 ug/ml FEU   Troponin I   Result Value Ref Range    Troponin I ES <0.015 0.000 - 0.045 ug/L   Troponin I - Now then in 4 hours x 2    Result Value Ref Range    Troponin I ES <0.015 0.000 - 0.045 ug/L   EKG 12 lead   Result Value Ref Range    Interpretation ECG Click View Image link to view waveform and result    Echo stress test with definity    Narrative    [id number]  [id number]  [id number]  [id number]^MICHEAL^LICO^ANTONI           [facility],[place]  Echocardiography Laboratory  [address] [place] [id number]  Name: VIRGEN GUEVARA  MRN: [id number]  : 1965  Study Date: 2018 10:19 AM  Age: 52 yrs  Gender: Female  Patient Location: [facility]  Reason For Study: Chest Pain  Ordering Physician: LICO BURTON  Performed By: Siddhartha Agustin RDCS     BSA: 1.7 m2  Height: 63 in  Weight: 146 lb  HR: 77  BP: 118/85 mmHg  _____________________________________________________________________________  __        Procedure  Stress Echo Bike with two dimensional, color and spectral Doppler performed.  Contrast Definity.  _____________________________________________________________________________  __        Interpretation Summary  Exercise stress echocardiogram with no inducible ischemia.  Target heart rate achieved. Normal blood pressure response to exercise.  No angina symptoms with exercise.  No ECG evidence of ischemia.  Normal segmental and global LV function with EF of approximately 55-60% at  rest; with exercise left ventricular cavity size decreases and LVEF increases  to 65-70%.  No stress induced regional wall motion abnormalities.  Less than average functional capacity for age. Exercise time 4:06 mn  Normal aortic root  and no significant valvular dysfunction noted on screening  2D and Doppler examination.  _____________________________________________________________________________  __     Stress  There was a normal BP response to exercise.  Definity (NDC #76309-962-34) given intravenously.  Patient was given 5ml mixture of 1.5ml Definity and 8.5ml saline.  Optison (NDC #0780-2382-14) given intravenously.  5 ml wasted.  There was no new ST segment depression.  Peak MVO2 22.7 ml/kg/min .  Percent predicted MVO2 81 %.  RPP 09594.  Maximum workload 100 [place].  Target Heart Rate was achieved.  Exercise was stopped due to fatigue.  The patient did not exhibit any symptoms during exercise.     Rest  The baseline ECG displays normal sinus rhythm.     Stress Results                                       Maximum Predicted HR:   168 bpm             Target HR: 143 bpm        % Maximum Predicted HR: 96 %                           Stage  DurationHeart Rate  BP  Dose                               (mm:ss)   (bpm)                       BASELINE  0:00      77    118/850.00                         PEAK    4:06     162    164/710.00                            Stress Duration:   4:06 mm:ss *                      Maximum Stress HR: 162 bpm *     Left Ventricle  Left ventricular systolic function is normal.     Aortic Valve  The aortic valve is normal in structure and function.     Mitral Valve  The mitral valve is normal in structure and function.        Tricuspid Valve  The tricuspid valve is normal in structure and function.     Right Ventricle  The right ventricular systolic function is normal.     Pericardium  There is no pericardial effusion.  _____________________________________________________________________________  __     MMode/2D Measurements & Calculations  IVSd: 0.83 cm  LVIDd: 4.1 cm  LVIDs: 2.7 cm  LVPWd: 0.72 cm  FS: 34.1 %  LV mass(C)dI: 55.8 grams/m2  asc Aorta Diam: 2.9 cm  RWT: 0.35                     _____________________________________________________________________________  __        Report approved by: Timothy Jj 04/01/2018 11:08 AM         Recent Results (from the past 48 hour(s))   XR Chest 2 Views    Narrative    XR CHEST 2 VW  3/31/2018 11:34 PM      HISTORY: chest pain;     COMPARISON: None    FINDINGS: Cardiac silhouette is within normal limits. No pleural  effusion or pneumothorax. No focal airspace opacity.      Impression    IMPRESSION: Clear chest    I have personally reviewed the examination and initial interpretation  and I agree with the findings.    IGNACIO BARRIGA MD   NM Lung Scan Ventilation and Perfusion    Narrative    Examination:NM LUNG SCAN VENTILATION AND PERFUSION, 4/1/2018 12:24 PM     Indication:  Rule out pulmonary embolism.     Additional Information: none    Technique:    The patient received 2 mCi of Tc-99m DTPA aerosol for ventilation and  6.5 mCi of Tc-99m MAA for perfusion. Multiple images were obtained of  the lungs in Anterior, posterior, MOTA, RPO, LPO, and  Wallisian projections.    Comparison: Radiograph 3/31/2019    Findings:    The ventilation study demonstrates no focal ventilation defects.    The perfusion study demonstrates no focal perfusion defects.      Impression    Impression: Pulmonary emboli are not present.    I have personally reviewed the examination and initial interpretation  and I agree with the findings.    IGNACIO BARRIGA MD                Pending Results:   Unresulted Labs Ordered in the Past 30 Days of this Admission     No orders found for last 61 day(s).                  Discharge Instructions and Follow-Up:     Discharge Procedure Orders  Reason for your hospital stay   Order Comments: Chest pain     [facility] Follow-up and recommended labs and tests   Order Comments: Follow up with PCP in one week, return to the ER if having chest pain, syncope, shortness of breath or fever greater than 101F.     Appointments on [facility] and/or  [facility] (with [facility] or [facility] provider or service). Call [phone] if you haven't heard regarding these appointments within 7 days of discharge.     Activity   Order Comments: Your activity upon discharge: As tolerated   Order Specific Question Answer Comments   Is discharge order? Yes      When to contact your care team   Order Comments: Please go to your nearest emergency room If you were to have a change in the type of chest pain i.e more severe, lasting longer or radiating to your shoulder, arm, neck, jaw or back, shortness of breath or increased pain with breathing, coughing up blood, feel dizzy or lightheaded, or notice swelling in one leg.     Discharge Instructions   Order Comments: The chest pain you came into the emergency room for does not appear to be cardiac chest pain. Your heart enzymes were normal which tells us there was no damage to the heart muscle. Your stress test was normal. Your V/Q scan checking for blood clots in your lungs and this was negative.     Trial Prilosec over the counter for 1-2 weeks and follow up with your primary care doctor next week.     Continue to drink plenty of fluids.     Diet   Order Comments: Follow this diet upon discharge: Regular diet   Order Specific Question Answer Comments   Is discharge order? Yes             Attestation:  Stacey Chacon.

## 2018-04-01 NOTE — ED NOTES
Patient presents with c/o chest pain. Patient reports left-sided chest pain that began about an hour prior to arrival, states pain radiates to shoulder, back and neck. Denies nausea, but endorses slight SOB. Denies cardiac history, but states father and brother have had MIs. Did take heartburn medication prior to arrival with no relief.

## 2018-04-01 NOTE — PLAN OF CARE
Problem: Patient Care Overview  Goal: Plan of Care/Patient Progress Review  Outpatient/Observation goals to be met before discharge home:    PRIMARY DIAGNOSIS: Chest Pain  OUTPATIENT/OBSERVATION GOALS TO BE MET BEFORE DISCHARGE:  Serial troponins and stress test complete: NO-two trops yet to be drawn; first one neg in ED; stress test to be completed in AM.  Seen and cleared by consultant if applicable: NO-none ordered at this time.  Adequate pain control on oral analgesia: YES-per pt minimal pain at this time; did not want any med for pain; will continue to monitor.  Vital signs normal or at patient baseline: YES-vitals WNL.  Safe disposition plan has been identified: NO-yet to be determined.  *Nurse to notify MD when observation goals have been met and patient is ready for discharge.

## 2018-04-01 NOTE — H&P
[facility] ED Observation Admission Note    Chief Complaint   Patient presents with     Chest Pain       Assessment/Plan:  # Acute chest pain  - Sudden onset of chest pain this evening  - EKG WNL, normal sinus.   - Serial troponins x 3 (Q4 hours).  First troponin negative at 2242 on 3/31.    -  CXR WNL, d-dimer WNL. Does report hx of blood clot several years ago (unclear as to cause). Pt has been more immobile post-op, however no reported calf pain, leg swelling.  VSS, no tachycardia or hypoxic.   - Risk factors: significant family history (brothers both with MIs in 50s and 60s).    - Plan for stress test in AM   - nitroglycerin SL PRN    # Recent hx appendectomy  - tylenol PRN pain      HPI:    Mariely Mark is a 52 year old female with a history notable for recent appendicitis s/p uncomplicated appendectomy on 3/14/18 and familial history of premature CAD who presents to the ED for the evaluation of chest pain. The patient reports that she noted sharp chest pain on her left side while sedentary after returning from dinner this evening, and she initially attributed this to GERD symptoms. She then took some of her 's gaviscon, but this did not provide her with any relief. She then noted the symptoms begin to radiate to her left shoulder, neck, and back, which prompted her to seek evaluation. She does endorse some slight SOB, but she denies any other associated symptoms such as nausea, vomiting, lightheadedness, or diaphoresis. No cough or congestion. No abdominal pain other than that resultant from her recent appendectomy. The patient does not smoke. No recreational cocaine or stimulant use. She does note a history of early-onset CAD as both her brothers (50s) and father (60s) have had MIs now.     In the ED the patient is still having chest pain that she rates to be moderate-severe. She was given sublingual nitro, EKG and CXR WNL, and labs WNL (troponin negative).    On admission to the observation unit the  patient was stable, chest pain is still present- the pain is anterior, radiating to her back and shoulder.  No associated SOB or palpitations.  She denies any nausea, vomiting, diarrhea or constipation.  She does report she is very active and healthy- exercises 6 times a week, eats well, does not smoke. She has been more immobile since her appendectomy a couple weeks ago. No lower leg swelling. Reports history of blood clot in calf several years ago, does not remember what caused it or the treatment, but was not able to work for a couple weeks.      History:    Past Medical History:   Diagnosis Date     ASCUS with positive high risk HPV cervical 05/11/2017 05/11/17: Ascus pap, Endometrial cells present, + HR HPV (not 16 or 18) result.     H/O colposcopy with cervical biopsy 08/28/2013, 07/13/17    LSIL, 07/13/17: [place] Bx Normal, ECC and Endometrial Bx  Atypical squamous epithelial cells, cannot exclude high grade squamousintraepithelial lesion with benign endocervical cells.      LSIL (low grade squamous intraepithelial lesion) on Pap smear 7/19/13     LSIL (low grade squamous intraepithelial lesion) on Pap smear 9/10/14    + HR HPV     LSIL (low grade squamous intraepithelial lesion) on Pap smear 2/24/15    + HR HPV     S/P bunionectomy 2006    L foot       Past Surgical History:   Procedure Laterality Date     COLONOSCOPY WITH CO2 INSUFFLATION N/A 7/18/2017    Procedure: COLONOSCOPY WITH CO2 INSUFFLATION;  COLON SCREEN/ RIVERS;  Surgeon: Jeremy Moran MD;  Location: [facility] OR     LAPAROSCOPIC APPENDECTOMY N/A 3/14/2018    Procedure: LAPAROSCOPIC APPENDECTOMY;  laparoscopic appendectomy;  Surgeon: Agueda Torres MD;  Location: [facility] OR       Family History   Problem Relation Age of Onset     Hypertension Mother      C.A.D. Father 58     MI, stents     Hypertension Father      C.A.D. Brother 50     MI     DIABETES No family hx of      Breast Cancer No family hx of      Cancer - colorectal No family  hx of        Social History     Social History     Marital status: Single     Spouse name: N/A     Number of children: N/A     Years of education: N/A     Occupational History     Not on file.     Social History Main Topics     Smoking status: Never Smoker     Smokeless tobacco: Never Used     Alcohol use Yes      Comment: 1 time per week     Drug use: No     Sexual activity: Yes     Partners: Male     Other Topics Concern     Parent/Sibling W/ Cabg, Mi Or Angioplasty Before 65f 55m? Yes     Social History Narrative         No current facility-administered medications on file prior to encounter.   Current Outpatient Prescriptions on File Prior to Encounter:  acetaminophen (TYLENOL) 325 MG tablet Take 2 tablets (650 mg) by mouth every 6 hours as needed for mild pain   oxyCODONE IR (ROXICODONE) 5 MG tablet Take 1 tablet (5 mg) by mouth every 4 hours as needed for pain maximum 6 tablet(s) per day   estradiol (VIVELLE-DOT) 0.05 MG/24HR BIW patch 1 patch twice a week   PROGESTERONE MICRONIZED PO Take 150 mg by mouth daily       Data:    Results for orders placed or performed during the hospital encounter of 03/31/18   XR Chest 2 Views    Impression    IMPRESSION: Clear chest   CBC with platelets differential   Result Value Ref Range    WBC 5.4 4.0 - 11.0 10e9/L    RBC Count 4.24 3.8 - 5.2 10e12/L    Hemoglobin 13.2 11.7 - 15.7 g/dL    Hematocrit 38.8 35.0 - 47.0 %    MCV 92 78 - 100 fl    MCH 31.1 26.5 - 33.0 pg    MCHC 34.0 31.5 - 36.5 g/dL    RDW 13.2 10.0 - 15.0 %    Platelet Count 220 150 - 450 10e9/L    Diff Method Automated Method     % Neutrophils 64.7 %    % Lymphocytes 21.5 %    % Monocytes 9.7 %    % Eosinophils 3.5 %    % Basophils 0.2 %    % Immature Granulocytes 0.4 %    Nucleated RBCs 0 0 /100    Absolute Neutrophil 3.5 1.6 - 8.3 10e9/L    Absolute Lymphocytes 1.2 0.8 - 5.3 10e9/L    Absolute Monocytes 0.5 0.0 - 1.3 10e9/L    Absolute Eosinophils 0.2 0.0 - 0.7 10e9/L    Absolute Basophils 0.0 0.0 - 0.2  10e9/L    Abs Immature Granulocytes 0.0 0 - 0.4 10e9/L    Absolute Nucleated RBC 0.0    Basic metabolic panel   Result Value Ref Range    Sodium 140 133 - 144 mmol/L    Potassium 3.4 3.4 - 5.3 mmol/L    Chloride 108 94 - 109 mmol/L    Carbon Dioxide 24 20 - 32 mmol/L    Anion Gap 7 3 - 14 mmol/L    Glucose 75 70 - 99 mg/dL    Urea Nitrogen 19 7 - 30 mg/dL    Creatinine 0.99 0.52 - 1.04 mg/dL    GFR Estimate 59 (L) >60 mL/min/1.7m2    GFR Estimate If Black 71 >60 mL/min/1.7m2    Calcium 8.9 8.5 - 10.1 mg/dL   Troponin I   Result Value Ref Range    Troponin I ES <0.015 0.000 - 0.045 ug/L   D dimer quantitative   Result Value Ref Range    D Dimer 0.4 0.0 - 0.50 ug/ml FEU   EKG 12 lead   Result Value Ref Range    Interpretation ECG Click View Image link to view waveform and result              EKG Interpretation:    Symptoms at time of EKG: Chest pain  Rhythm: normal sinus   Rate: normal  Axis: normal  Ectopy: none  Conduction: normal  ST Segments/ T Waves: No ST-T wave changes  Q Waves: none  Comparison to prior: No old EKG available     Clinical Impression: Normal sinus rhythm without acute ischemia    ROS:    Review of Systems   Constitutional: Negative for diaphoresis and fever.   Respiratory: Negative for shortness of breath or cough.  Cardiovascular: Positive for chest pain.   Gastrointestinal: Negative for abdominal pain, diarrhea, nausea and vomiting.    Neurological: Negative for light-headedness.   Hematological: Negative for adenopathy.   Psychiatric/Behavioral: Negative for agitation and behavioral problems.   All other systems reviewed and are negative.  10 point ROS negative other than the symptoms noted above.    CARDIAC RISK: low (family history, age)  Heart Score         Heart Score for Chest Pain Patients   History Highly Suspicious 2 0    Moderately Suspicious 1     Slightly Suspicious 0    EKG Significant ST depression 2 0    Nonspecific Repolarization 1     Normal 0    Age >65 years 2 1    45 - 65  years 1     <45 years 0    Risk Factors 3 or more risk factors 2 1    1-2 risk factors 1     No known risk factors 0    Troponin >3x normal 2 0    >1 - <3x normal 1     Normal limit 0    Total 2   *Risk factors for atherosclerotic disease:   Hypercholesterolemia, Hypertension, DM, Cigarette smoking, Family History, Obesity   * Significant ST depression defined as changes of 1mm or greater. T wave inversions and ST depression of 0.5mm considered nonspecific       Exam:    Vitals:  B/P: 117/72, T: 98.2, P: 92, R: 22  Constitutional: She is oriented to person, place, and time. She appears well-developed and well-nourished. No distress.   HENT:   Head: Normocephalic and atraumatic.   Mouth/Throat: Oropharynx is clear and moist. No oropharyngeal exudate.   Eyes: Conjunctivae and EOM are normal. No scleral icterus.   Neck: Normal range of motion.   Cardiovascular: Normal rate, normal heart sounds and intact distal pulses.    Pulmonary/Chest: Effort normal and breath sounds normal. No respiratory distress. She has no wheezes.   Abdominal: Soft. Bowel sounds are normal. She exhibits no distension. There is no tenderness. There is no rebound and no guarding.   Surgical wounds are well-healed, clean, dry, intact.   Musculoskeletal: Normal range of motion. She exhibits no edema or tenderness.   Neurological: She is alert and oriented to person, place, and time. No cranial nerve deficit. She exhibits normal muscle tone. Coordination normal.   Skin: Skin is warm. No rash noted. She is not diaphoretic.   Psychiatric: She has a normal mood and affect. Her behavior is normal. Judgment and thought content normal.   Nursing note and vitals reviewed.    Consults: none  FEN: Clear liquid diet, no caffeine until stress test complete  DVT prophylaxis: None, ambulating regularly.   Code Status: FULL  Disposition: Stress ECHO in AM then likely home if negative.      Signed:  Gladis Colon CNP  Emergency Department Observation Unit  [facility]  Phone: [phone]   April 1, 2018 at 12:06 AM

## 2018-04-01 NOTE — PLAN OF CARE
Problem: Patient Care Overview  Goal: Plan of Care/Patient Progress Review  Outcome: Improving    Observation goals PRIOR TO DISCHARGE     Comments: List all goals to be met before discharge home:     - Serial troponins and stress test complete: Yes.  - Seen and cleared by consultant if applicable: No  - Adequate pain control on oral analgesia: Still having mild chest pain, no analgesia needed.  - Vital signs normal or at patient baseline: Yes   - Safe disposition plan has been identified: No  - Nurse to notify provider when observation goals have been met and patient is ready for discharge.

## 2018-04-01 NOTE — PROGRESS NOTES
Pt's chest pain resolving. No pain meds given. VSS. All tests and lab results were normal. Ambulating independently. Discharged home. Discharge instructions given.

## 2018-04-01 NOTE — PLAN OF CARE
Problem: Patient Care Overview  Goal: Plan of Care/Patient Progress Review  Outcome: Improving  Outpatient/Observation goals to be met before discharge home:       Observation goals PRIOR TO DISCHARGE     Comments: List all goals to be met before discharge home:     - Serial troponins and stress test complete: No  - Seen and cleared by consultant if applicable: No  - Adequate pain control on oral analgesia: Still having mild chest pain, no analgesia needed.  - Vital signs normal or at patient baseline: Yes   - Safe disposition plan has been identified: No  - Nurse to notify provider when observation goals have been met and patient is ready for discharge.

## 2018-04-01 NOTE — ED PROVIDER NOTES
History     Chief Complaint   Patient presents with     Chest Pain     HPI  Mariely Mark is a 52 year old female with a history notable for recent appendicitis s/p uncomplicated appendectomy on 3/14/18 and familial history of premature CAD who presents to the ED for the evaluation of chest pain. The patient reports that she noted sharp, intermittent, mild-moderate chest pain on her left side while sedentary after returning from dinner this evening, and she initially attributed this to GERD symptoms. She then took some of her fiancé's Gaviscon, but this did not provide her with any relief. She then noted the symptoms begin to radiate to her left shoulder, neck, and back, which prompted her to seek evaluation. She does endorse some slight SOB, but she denies any other associated symptoms such as nausea, vomiting, lightheadedness, or diaphoresis. No cough or congestion. No abdominal pain other than that resultant from her recent appendectomy. The patient does not smoke.  No fevers or cough.  Nothing makes symptoms better or worse.  No recreational cocaine or stimulant use. She does note a history of early-onset CAD as both her brothers (50s) and father (60s) have had MIs now.    PAST MEDICAL HISTORY:   Past Medical History:   Diagnosis Date     ASCUS with positive high risk HPV cervical 05/11/2017 05/11/17: Ascus pap, Endometrial cells present, + HR HPV (not 16 or 18) result.     H/O colposcopy with cervical biopsy 08/28/2013, 07/13/17    LSIL, 07/13/17: [place] Bx Normal, ECC and Endometrial Bx  Atypical squamous epithelial cells, cannot exclude high grade squamousintraepithelial lesion with benign endocervical cells.      LSIL (low grade squamous intraepithelial lesion) on Pap smear 7/19/13     LSIL (low grade squamous intraepithelial lesion) on Pap smear 9/10/14    + HR HPV     LSIL (low grade squamous intraepithelial lesion) on Pap smear 2/24/15    + HR HPV     S/P bunionectomy 2006    L foot       PAST  SURGICAL HISTORY:   Past Surgical History:   Procedure Laterality Date     COLONOSCOPY WITH CO2 INSUFFLATION N/A 7/18/2017    Procedure: COLONOSCOPY WITH CO2 INSUFFLATION;  COLON SCREEN/ RIVERS;  Surgeon: Jeremy Moran MD;  Location: [facility] OR     LAPAROSCOPIC APPENDECTOMY N/A 3/14/2018    Procedure: LAPAROSCOPIC APPENDECTOMY;  laparoscopic appendectomy;  Surgeon: Agueda Torres MD;  Location: [facility] OR       FAMILY HISTORY:   Family History   Problem Relation Age of Onset     Hypertension Mother      C.A.D. Father 58     MI, stents     Hypertension Father      C.A.D. Brother 50     MI     DIABETES No family hx of      Breast Cancer No family hx of      Cancer - colorectal No family hx of        SOCIAL HISTORY:   Social History   Substance Use Topics     Smoking status: Never Smoker     Smokeless tobacco: Never Used     Alcohol use Yes      Comment: 1 time per week       Patient's Medications   New Prescriptions    No medications on file   Previous Medications    ACETAMINOPHEN (TYLENOL) 325 MG TABLET    Take 2 tablets (650 mg) by mouth every 6 hours as needed for mild pain    ESTRADIOL (VIVELLE-DOT) 0.05 MG/24HR BIW PATCH    1 patch twice a week    OXYCODONE IR (ROXICODONE) 5 MG TABLET    Take 1 tablet (5 mg) by mouth every 4 hours as needed for pain maximum 6 tablet(s) per day    PROGESTERONE MICRONIZED PO    Take 150 mg by mouth daily   Modified Medications    No medications on file   Discontinued Medications    No medications on file          Allergies   Allergen Reactions     Dye [Contrast Dye] Hives     Penicillins Hives         I have reviewed the Medications, Allergies, Past Medical and Surgical History, and Social History in the Epic system.    Review of Systems   Constitutional: Negative for diaphoresis and fever.   HENT: Negative for congestion.    Eyes: Negative for visual disturbance.   Respiratory: Positive for shortness of breath (mild). Negative for cough.    Cardiovascular: Positive for  "chest pain.   Gastrointestinal: Negative for abdominal pain, diarrhea, nausea and vomiting.   Endocrine: Negative for polydipsia and polyuria.   Genitourinary: Negative for difficulty urinating.   Musculoskeletal: Negative for back pain, neck pain and neck stiffness.   Skin: Negative for color change.   Allergic/Immunologic: Negative for immunocompromised state.   Neurological: Negative for light-headedness.   Hematological: Negative for adenopathy.   Psychiatric/Behavioral: Negative for agitation and behavioral problems.   All other systems reviewed and are negative.      Physical Exam   BP: 150/75  Pulse: 92  Heart Rate: 87  Temp: 98.2  F (36.8  C)  Resp: 18  Height: 160 cm (5' 3\")  Weight: 66.3 kg (146 lb 1.6 oz)  SpO2: 99 %      Physical Exam   Constitutional: She is oriented to person, place, and time. She appears well-developed and well-nourished. No distress.   HENT:   Head: Normocephalic and atraumatic.   Mouth/Throat: Oropharynx is clear and moist. No oropharyngeal exudate.   Eyes: Conjunctivae and EOM are normal. No scleral icterus.   Neck: Normal range of motion.   Cardiovascular: Normal rate, normal heart sounds and intact distal pulses.    Pulmonary/Chest: Effort normal and breath sounds normal. No respiratory distress. She has no wheezes.   Abdominal: Soft. Bowel sounds are normal. She exhibits no distension. There is no tenderness. There is no rebound and no guarding.   Surgical wounds are well-healed, clean, dry, intact.   Musculoskeletal: Normal range of motion. She exhibits no edema or tenderness.   Neurological: She is alert and oriented to person, place, and time. No cranial nerve deficit. She exhibits normal muscle tone. Coordination normal.   Skin: Skin is warm. No rash noted. She is not diaphoretic.   Psychiatric: She has a normal mood and affect. Her behavior is normal. Judgment and thought content normal.   Nursing note and vitals reviewed.      ED Course     ED Course     Procedures         "     EKG Interpretation:      Interpreted by Trinh Horner  Time reviewed: 10:30 PM  Symptoms at time of EKG: Chest pain  Rhythm: normal sinus   Rate: normal  Axis: normal  Ectopy: none  Conduction: normal  ST Segments/ T Waves: No ST-T wave changes  Q Waves: none  Comparison to prior: No old EKG available    Clinical Impression: Normal sinus rhythm without acute ischemia          Critical Care time:  none             Labs Ordered and Resulted from Time of ED Arrival Up to the Time of Departure from the ED   BASIC METABOLIC PANEL - Abnormal; Notable for the following:        Result Value    GFR Estimate 59 (*)     All other components within normal limits   CBC WITH PLATELETS DIFFERENTIAL   TROPONIN I   D DIMER QUANTITATIVE   PERIPHERAL IV CATHETER   CARDIAC CONTINUOUS MONITORING   PULSE OXIMETRY NURSING            Assessments & Plan (with Medical Decision Making)   This is a 52 year old woman with recent appendectomy presenting with chest pain for the last 1 hour. Differential diagnosis: ACS, pneumonia, pneumothorax, esophageal spasm, GERD, pulmonary embolus, unlikely aortic dissection.    After thorough history and physical exam, patient appears to be in no acute distress. I will treat her pain with sublingual nitroglycerin, and obtain EKG, chest x-ray and laboratory studies. She and her fiance agree with the plan.    Patient's laboratory studies returned without any evidence of leukocytosis, WBC is normal at 5400. There is no evidence of anemia, hemoglobin is normal at 13.2.  Electrolytes show no evidence of dehydration, creatinine is normal at 0.99.  Troponin is undetectable.  D-dimer is within normal limits 0.4 and I do not believe the patient needs further evaluation for pulmonary embolus.  I reviewed her chest x-ray and I read the radiology report; there is no evidence of pneumonia, pneumothorax, or widened mediastinum.  At this point patient will be admitted to the observation for further evaluation.      I  have reviewed the nursing notes.    I have reviewed the findings, diagnosis, plan and need for follow up with the patient.    New Prescriptions    No medications on file       Final diagnoses:   Chest pain, unspecified type   I, Dwigth Euceda, am serving as a trained medical scribe to document services personally performed by Trinh Horner MD, based on the provider's statements to me.      I, Trinh Horner MD, was physically present and have reviewed and verified the accuracy of this note documented by Dwight Euceda.       3/31/2018   [facility], [place], EMERGENCY DEPARTMENT       Trinh Horner MD  04/01/18 0009

## 2018-04-02 ENCOUNTER — TELEPHONE (OUTPATIENT)
Dept: FAMILY MEDICINE | Facility: CLINIC | Age: 53
End: 2018-04-02

## 2018-04-02 NOTE — TELEPHONE ENCOUNTER
This patient was discharged from [facility] on 04/01/2018.    Discharge Diagnosis:Chest Pain,    Follow-up instructions: Trial Prilosec over the counter for 1-2 weeks and follow up with your primary care doctor next week.     A follow-up visit has not been scheduled.      Please follow-up with patient.

## 2018-04-02 NOTE — PROGRESS NOTES
Patient has not returned call.  She was evaluated in the ED yesterday for a different concern (chest pain) and wounds were examined. No concerns related to previous appendectomy.  Encounter closed.

## 2018-04-02 NOTE — TELEPHONE ENCOUNTER
ED / Discharge Outreach Protocol    Patient Contact    Attempt # 1    Was call answered?  No.  Left message on voicemail with information to call me back.  Barbara Whitmore RN CPC Triage.

## 2018-04-03 NOTE — TELEPHONE ENCOUNTER
"  ED for acute condition Discharge Protocol    \"Hi, my name is Penny Vieira, a registered nurse, and I am calling from [facility].  I am calling to follow up and see how things are going for you after your recent emergency visit.\"    Tell me how you are doing now that you are home?\" chest pain is resolved, however she does report she's having some bloating and abdominal pain, had appendix on 3/14/18.   Denies fever, chills, or vomiting.   Is a bit nauseated.   She is leaving tonight to go on vacation, has not started the prilosec but will bring some along.           Discharge Instructions    \"Let's review your discharge instructions.  What is/are the follow-up recommendations?  Pt. Response: follow up if needed     \"Has an appointment with your primary care provider been scheduled?\"  not yet, she will decide if she wants to be seen when back from vacaton    Medications    \"Tell me what changed about your medicines when you discharged?\"    Over the counter prilosec advised    \"What questions do you have about your medications?\"   None        Call Summary    \"What questions or concerns do you have about your recent visit and your follow-up care?\"     none    \"If you have questions or things don't continue to improve, we encourage you contact us through the main clinic number (give number).  Even if the clinic is not open, triage nurses are available 24/7 to help you.     Advised her to monitor her stomach symptoms, see eval in ER on vacation if has sudden increased pain, fever, chills, vomiting.    Patient verbalized understanding of and agreement with plan.    Penny Vieira RN  [facility]                                "

## 2018-09-25 ENCOUNTER — HEALTH MAINTENANCE LETTER (OUTPATIENT)
Age: 53
End: 2018-09-25

## 2018-10-31 ENCOUNTER — TRANSFERRED RECORDS (OUTPATIENT)
Dept: HEALTH INFORMATION MANAGEMENT | Facility: CLINIC | Age: 53
End: 2018-10-31

## 2018-11-04 ASSESSMENT — ENCOUNTER SYMPTOMS
PALPITATIONS: 0
ABDOMINAL PAIN: 0
PARESTHESIAS: 0
CONSTIPATION: 0
FREQUENCY: 0
EYE PAIN: 0
CHILLS: 0
DIZZINESS: 0
ARTHRALGIAS: 1
BREAST MASS: 0
DYSURIA: 0
DIARRHEA: 0
NAUSEA: 0
HEMATURIA: 0
SHORTNESS OF BREATH: 0
JOINT SWELLING: 0
HEADACHES: 0
HEARTBURN: 0
WEAKNESS: 0
NERVOUS/ANXIOUS: 0
MYALGIAS: 0
FEVER: 0
SORE THROAT: 0
HEMATOCHEZIA: 0
COUGH: 0

## 2018-11-07 ENCOUNTER — OFFICE VISIT (OUTPATIENT)
Dept: FAMILY MEDICINE | Facility: CLINIC | Age: 53
End: 2018-11-07
Payer: COMMERCIAL

## 2018-11-07 ENCOUNTER — RESULT FOLLOW UP (OUTPATIENT)
Dept: FAMILY MEDICINE | Facility: CLINIC | Age: 53
End: 2018-11-07

## 2018-11-07 VITALS
SYSTOLIC BLOOD PRESSURE: 116 MMHG | TEMPERATURE: 98.1 F | WEIGHT: 142.38 LBS | HEART RATE: 72 BPM | DIASTOLIC BLOOD PRESSURE: 68 MMHG | BODY MASS INDEX: 25.23 KG/M2 | HEIGHT: 63 IN

## 2018-11-07 DIAGNOSIS — N95.1 SYMPTOMATIC MENOPAUSAL OR FEMALE CLIMACTERIC STATES: ICD-10-CM

## 2018-11-07 DIAGNOSIS — N64.4 BREAST TENDERNESS: ICD-10-CM

## 2018-11-07 DIAGNOSIS — Z12.31 VISIT FOR SCREENING MAMMOGRAM: ICD-10-CM

## 2018-11-07 DIAGNOSIS — R63.5 WEIGHT GAIN: ICD-10-CM

## 2018-11-07 DIAGNOSIS — Z00.01 ENCOUNTER FOR ROUTINE ADULT MEDICAL EXAM WITH ABNORMAL FINDINGS: Primary | ICD-10-CM

## 2018-11-07 DIAGNOSIS — R87.612 PAPANICOLAOU SMEAR OF CERVIX WITH LOW GRADE SQUAMOUS INTRAEPITHELIAL LESION (LGSIL): ICD-10-CM

## 2018-11-07 DIAGNOSIS — B00.9 HSV (HERPES SIMPLEX VIRUS) INFECTION: ICD-10-CM

## 2018-11-07 PROCEDURE — 88141 CYTOPATH C/V INTERPRET: CPT | Performed by: PHYSICIAN ASSISTANT

## 2018-11-07 PROCEDURE — 87624 HPV HI-RISK TYP POOLED RSLT: CPT | Performed by: PHYSICIAN ASSISTANT

## 2018-11-07 PROCEDURE — 36415 COLL VENOUS BLD VENIPUNCTURE: CPT | Performed by: PHYSICIAN ASSISTANT

## 2018-11-07 PROCEDURE — 99213 OFFICE O/P EST LOW 20 MIN: CPT | Mod: 25 | Performed by: PHYSICIAN ASSISTANT

## 2018-11-07 PROCEDURE — 84443 ASSAY THYROID STIM HORMONE: CPT | Performed by: PHYSICIAN ASSISTANT

## 2018-11-07 PROCEDURE — 88175 CYTOPATH C/V AUTO FLUID REDO: CPT | Performed by: PHYSICIAN ASSISTANT

## 2018-11-07 PROCEDURE — 80061 LIPID PANEL: CPT | Performed by: PHYSICIAN ASSISTANT

## 2018-11-07 PROCEDURE — 99396 PREV VISIT EST AGE 40-64: CPT | Performed by: PHYSICIAN ASSISTANT

## 2018-11-07 RX ORDER — VALACYCLOVIR HYDROCHLORIDE 500 MG/1
500 TABLET, FILM COATED ORAL 2 TIMES DAILY
COMMUNITY
End: 2018-11-07

## 2018-11-07 RX ORDER — ESTRADIOL 0.04 MG/D
1 PATCH, EXTENDED RELEASE TRANSDERMAL
Qty: 8 PATCH | Refills: 1 | Status: SHIPPED | OUTPATIENT
Start: 2018-11-08 | End: 2018-11-19

## 2018-11-07 RX ORDER — VALACYCLOVIR HYDROCHLORIDE 500 MG/1
500 TABLET, FILM COATED ORAL DAILY
Qty: 90 TABLET | Refills: 3 | Status: SHIPPED | OUTPATIENT
Start: 2018-11-07 | End: 2019-10-03

## 2018-11-07 ASSESSMENT — ENCOUNTER SYMPTOMS
SHORTNESS OF BREATH: 0
MYALGIAS: 0
DIZZINESS: 0
NERVOUS/ANXIOUS: 0
HEMATURIA: 0
DYSURIA: 0
SORE THROAT: 0
BREAST MASS: 0
CHILLS: 0
WEAKNESS: 0
FEVER: 0
HEADACHES: 0
HEMATOCHEZIA: 0
DIARRHEA: 0
CONSTIPATION: 0
FREQUENCY: 0
HEARTBURN: 0
NAUSEA: 0
EYE PAIN: 0
ARTHRALGIAS: 1
PALPITATIONS: 0
PARESTHESIAS: 0
ABDOMINAL PAIN: 0
JOINT SWELLING: 0
COUGH: 0

## 2018-11-07 NOTE — PATIENT INSTRUCTIONS
Cheyenne or her team will be in touch with you with results.  Call [phone] to schedule mammogram.  Try lower dose of estrogen patch.    Cheyenne Snider PA-C    Preventive Health Recommendations  Female Ages 50 - 64    Yearly exam: See your health care provider every year in order to  o Review health changes.   o Discuss preventive care.    o Review your medicines if your doctor has prescribed any.      Get a Pap test every three years (unless you have an abnormal result and your provider advises testing more often).    If you get Pap tests with HPV test, you only need to test every 5 years, unless you have an abnormal result.     You do not need a Pap test if your uterus was removed (hysterectomy) and you have not had cancer.    You should be tested each year for STDs (sexually transmitted diseases) if you're at risk.     Have a mammogram every 1 to 2 years.    Have a colonoscopy at age 50, or have a yearly FIT test (stool test). These exams screen for colon cancer.      Have a cholesterol test every 5 years, or more often if advised.    Have a diabetes test (fasting glucose) every three years. If you are at risk for diabetes, you should have this test more often.     If you are at risk for osteoporosis (brittle bone disease), think about having a bone density scan (DEXA).    Shots: Get a flu shot each year. Get a tetanus shot every 10 years.    Nutrition:     Eat at least 5 servings of fruits and vegetables each day.    Eat whole-grain bread, whole-wheat pasta and brown rice instead of white grains and rice.    Get adequate Calcium and Vitamin D.     Lifestyle    Exercise at least 150 minutes a week (30 minutes a day, 5 days a week). This will help you control your weight and prevent disease.    Limit alcohol to one drink per day.    No smoking.     Wear sunscreen to prevent skin cancer.     See your dentist every six months for an exam and cleaning.    See your eye doctor every 1 to 2 years.

## 2018-11-07 NOTE — LETTER
April 30, 2019      Mariely MALKA Hever  [address]  [place] [address]    Dear ,      At [facility], your health and wellness is our primary concern. That is why we are following up on an abnormal pap from 11/07/18, which was reported as ASC-H and positive for high risk HPV. Your provider had recommended that you have a Colposcopy completed by 02/07/19. Our records do not show that this has been done or scheduled.      If you have chosen not to do the recommended colposcopy, please contact our office at [phone] to schedule an appointment for a repeat PAP smear and HPV test at your earliest convenience.    If you have completed the tests outside of [facility], please have the results forwarded to our office. We will update the chart for your primary Physician to review before your next annual physical.     Thank you for choosing [facility]!    Sincerely,      Your [facility] Care Team

## 2018-11-07 NOTE — MR AVS SNAPSHOT
After Visit Summary   11/7/2018    Mariely Kathleen    MRN: [id number]           Patient Information     Date Of Birth          1965        Visit Information        Provider Department      11/7/2018 8:00 AM Cheyenne Snider PA-C [facility]        Today's Diagnoses     Encounter for routine adult medical exam with abnormal findings    -  1    Visit for screening mammogram        HSV (herpes simplex virus) infection        Symptomatic menopausal or female climacteric states        Papanicolaou smear of cervix with low grade squamous intraepithelial lesion (LGSIL)        Weight gain          Care Instructions    Cheyenne or her team will be in touch with you with results.  Call [phone] to schedule mammogram.  Try lower dose of estrogen patch.    Cheyenne Snider PA-C    Preventive Health Recommendations  Female Ages 50 - 64    Yearly exam: See your health care provider every year in order to  o Review health changes.   o Discuss preventive care.    o Review your medicines if your doctor has prescribed any.      Get a Pap test every three years (unless you have an abnormal result and your provider advises testing more often).    If you get Pap tests with HPV test, you only need to test every 5 years, unless you have an abnormal result.     You do not need a Pap test if your uterus was removed (hysterectomy) and you have not had cancer.    You should be tested each year for STDs (sexually transmitted diseases) if you're at risk.     Have a mammogram every 1 to 2 years.    Have a colonoscopy at age 50, or have a yearly FIT test (stool test). These exams screen for colon cancer.      Have a cholesterol test every 5 years, or more often if advised.    Have a diabetes test (fasting glucose) every three years. If you are at risk for diabetes, you should have this test more often.     If you are at risk for osteoporosis (brittle bone disease), think about having a bone density scan  (DEXA).    Shots: Get a flu shot each year. Get a tetanus shot every 10 years.    Nutrition:     Eat at least 5 servings of fruits and vegetables each day.    Eat whole-grain bread, whole-wheat pasta and brown rice instead of white grains and rice.    Get adequate Calcium and Vitamin D.     Lifestyle    Exercise at least 150 minutes a week (30 minutes a day, 5 days a week). This will help you control your weight and prevent disease.    Limit alcohol to one drink per day.    No smoking.     Wear sunscreen to prevent skin cancer.     See your dentist every six months for an exam and cleaning.    See your eye doctor every 1 to 2 years.            Follow-ups after your visit        Your next 10 appointments already scheduled     Nov 15, 2018  2:30 PM CST   Office Visit with Maegan Larkin MD   [facility]    [address] [place] [id number] [phone]           Bring a current list of meds and any records pertaining to this visit. For Physicals, please bring immunization records and any forms needing to be filled out. Please arrive 10 minutes early to complete paperwork.              Future tests that were ordered for you today     Open Future Orders        Priority Expected Expires Ordered    MA Diagnostic Left w/Ebenezer Routine  11/7/2019 11/7/2018    MA Screen Right w/Ebenezer Routine  11/7/2019 11/7/2018            Who to contact     If you have questions or need follow up information about today's clinic visit or your schedule please contact [facility] directly at [phone].  Normal or non-critical lab and imaging results will be communicated to you by MyChart, letter or phone within 4 business days after the clinic has received the results. If you do not hear from us within 7 days, please contact the clinic through MyChart or phone. If you have a critical or abnormal lab result, we will notify you by phone as soon as  "possible.  Submit refill requests through [employer] or call your pharmacy and they will forward the refill request to us. Please allow 3 business days for your refill to be completed.          Additional Information About Your Visit        [employer] Information     [employer] gives you secure access to your electronic health record. If you see a primary care provider, you can also send messages to your care team and make appointments. If you have questions, please call your primary care clinic.  If you do not have a primary care provider, please call [phone] and they will assist you.        Care EveryWhere ID     This is your Care EveryWhere ID. This could be used by other organizations to access your [facility] medical records  [id number]        Your Vitals Were     Pulse Temperature Height Last Period BMI (Body Mass Index)       72 98.1  F (36.7  C) (Oral) 5' 3\" (1.6 m) 04/16/2013 25.22 kg/m2        Blood Pressure from Last 3 Encounters:   11/07/18 116/68   04/01/18 108/67   03/15/18 101/78    Weight from Last 3 Encounters:   11/07/18 142 lb 6 oz (64.6 kg)   04/01/18 146 lb 9.6 oz (66.5 kg)   03/14/18 146 lb 1.6 oz (66.3 kg)              We Performed the Following     HPV High Risk Types DNA Cervical     Lipid panel reflex to direct LDL Fasting     Pap imaged thin layer diagnostic with HPV (select HPV order below)     TSH with free T4 reflex          Today's Medication Changes          These changes are accurate as of 11/7/18  8:56 AM.  If you have any questions, ask your nurse or doctor.               Start taking these medicines.        Dose/Directions    estradiol 0.0375 MG/24HR BIW patch   Commonly known as:  VIVELLE-DOT   Used for:  Symptomatic menopausal or female climacteric states   Replaces:  estradiol 0.05 MG/24HR BIW patch   Started by:  Cheyenne Snider PA-C        Dose:  1 patch   Start taking on:  11/8/2018   Place 1 patch onto the skin twice a week   Quantity:  8 patch   Refills:  1       " valACYclovir 500 MG tablet   Commonly known as:  VALTREX   Used for:  HSV (herpes simplex virus) infection   Started by:  Cheyenne Snider PA-C        Dose:  500 mg   Take 1 tablet (500 mg) by mouth daily   Quantity:  90 tablet   Refills:  3         Stop taking these medicines if you haven't already. Please contact your care team if you have questions.     estradiol 0.05 MG/24HR BIW patch   Commonly known as:  VIVELLE-DOT   Replaced by:  estradiol 0.0375 MG/24HR BIW patch   Stopped by:  Cheyenne Snider PA-C                Where to get your medicines      These medications were sent to [id number] - [place], [place] - [address] AT [facility] [address]  [address], [place] [address]     Phone:  [phone]     estradiol 0.0375 MG/24HR BIW patch    valACYclovir 500 MG tablet                Primary Care Provider Office Phone # Fax #    Cheyenne Snider PA-C [phone]       [address] [address]        Equal Access to Services     KATEY ARZATE AH: Hadii aad ku hadasho Soomaali, waaxda luqadaha, qaybta kaalmada adeegyada, waxay idiin hayaan ademario kharainez álvarez . So [facility] [phone].    ATENCIÓN: Si habla español, tiene a jordan disposición servicios gratuitos de asistencia lingüística. [facility] [phone].    We comply with applicable federal civil rights laws and [place] laws. We do not discriminate on the basis of race, color, national origin, age, disability, sex, sexual orientation, or gender identity.            Thank you!     Thank you for choosing [facility]  for your care. Our goal is always to provide you with excellent care. Hearing back from our patients is one way we can continue to improve our services. Please take a few minutes to complete the written survey that you may receive in the mail after your visit with us. Thank you!             Your Updated Medication List - Protect others  around you: Learn how to safely use, store and throw away your medicines at www.disposemymeds.org.          This list is accurate as of 11/7/18  8:56 AM.  Always use your most recent med list.                   Brand Name Dispense Instructions for use Diagnosis    estradiol 0.0375 MG/24HR BIW patch   Start taking on:  11/8/2018    VIVELLE-DOT    8 patch    Place 1 patch onto the skin twice a week    Symptomatic menopausal or female climacteric states       PROGESTERONE MICRONIZED PO      Take 150 mg by mouth daily        valACYclovir 500 MG tablet    VALTREX    90 tablet    Take 1 tablet (500 mg) by mouth daily    HSV (herpes simplex virus) infection

## 2018-11-07 NOTE — PROGRESS NOTES
SUBJECTIVE:   CC: Mariely Kathleen is an 52 year old woman who presents for preventive health visit.     Physical   Annual:     Getting at least 3 servings of Calcium per day:  NO    Bi-annual eye exam:  Yes    Dental care twice a year:  Yes    Sleep apnea or symptoms of sleep apnea:  None    Diet:  Carbohydrate counting    Frequency of exercise:  4-5 days/week    Duration of exercise:  45-60 minutes    Taking medications regularly:  Yes    Medication side effects:  Other    Additional concerns today:  Yes (Patient would like refill on VALTREX 500 MG tablet.  Patient would also like to know if Cheyenne could do her refills on Estradiol and Progesterone (listed historically).  Didscuss weight. Had cortisone shot for hip pain.  Needs MRI, can this be ordered?)    Works out every am, currently on the keto diet, which has been helpful. Is concerned as she continues to gain weight.  On estrogen and progesterone due to hot flashes but concerned this could be contributing to weight.    Hip pain x 10 years.  Was seen by [facility].  PT was recommended. Was given a steroid shot, but this was not helpful.     -------------------------------------    Today's PHQ-2 Score:   PHQ-2 ( 1999 [employer]) 11/4/2018   Q1: Little interest or pleasure in doing things 0   Q2: Feeling down, depressed or hopeless 0   PHQ-2 Score 0   Q1: Little interest or pleasure in doing things Not at all   Q2: Feeling down, depressed or hopeless Not at all   PHQ-2 Score 0       Abuse: Current or Past(Physical, Sexual or Emotional)- No  Do you feel safe in your environment - Yes    Social History   Substance Use Topics     Smoking status: Never Smoker     Smokeless tobacco: Never Used     Alcohol use Yes      Comment: 1 time per week     Alcohol Use 11/4/2018   If you drink alcohol do you typically have greater than 3 drinks per day OR greater than 7 drinks per week? No   No flowsheet data found.    Reviewed orders with patient.  Reviewed health maintenance and  "updated orders accordingly - Yes    Patient over age 50, mutual decision to screen reflected in health maintenance.    Pertinent mammograms are reviewed under the imaging tab.  History of abnormal Pap smear: YES - other categories - see link Cervical Cytology Screening Guidelines  PAP / HPV Latest Ref Rng & Units 5/11/2017 2/24/2015 9/10/2014   PAP - ASC-US(A) LSIL(A) LSIL(A)   HPV 16 DNA NEG Negative - -   HPV 18 DNA NEG Negative - -   OTHER HR HPV NEG Positive(A) - -     Reviewed and updated as needed this visit by clinical staff  Tobacco  Allergies  Meds  Problems  Med Hx  Surg Hx  Fam Hx  Soc Hx          Reviewed and updated as needed this visit by Provider  Problems            Review of Systems   Constitutional: Negative for chills and fever.   HENT: Positive for hearing loss. Negative for congestion, ear pain and sore throat.    Eyes: Negative for pain and visual disturbance.   Respiratory: Negative for cough and shortness of breath.    Cardiovascular: Negative for chest pain, palpitations and peripheral edema.   Gastrointestinal: Negative for abdominal pain, constipation, diarrhea, heartburn, hematochezia and nausea.   Breasts:  Negative for tenderness, breast mass and discharge.   Genitourinary: Negative for dysuria, frequency, genital sores, hematuria, pelvic pain, urgency, vaginal bleeding and vaginal discharge.   Musculoskeletal: Positive for arthralgias. Negative for joint swelling and myalgias.   Skin: Negative for rash.   Neurological: Negative for dizziness, weakness, headaches and paresthesias.   Psychiatric/Behavioral: Positive for mood changes. The patient is not nervous/anxious.      OBJECTIVE:   /68 (BP Location: Right arm, Cuff Size: Adult Regular)  Pulse 72  Temp 98.1  F (36.7  C) (Oral)  Ht 5' 3\" (1.6 m)  Wt 142 lb 6 oz (64.6 kg)  LMP 04/16/2013  BMI 25.22 kg/m2  Physical Exam  GENERAL: healthy, alert and no distress  EYES: Eyes grossly normal to inspection, PERRL and " conjunctivae and sclerae normal  HENT: ear canals and TM's normal, nose and mouth without ulcers or lesions  NECK: no adenopathy, no asymmetry, masses, or scars and thyroid normal to palpation  RESP: lungs clear to auscultation - no rales, rhonchi or wheezes  BREAST: normal without masses, tenderness or nipple discharge, no palpable axillary masses or adenopathy and tenderness left  CV: regular rate and rhythm, normal S1 S2, no S3 or S4, no murmur, click or rub, no peripheral edema and peripheral pulses strong  ABDOMEN: soft, nontender, no hepatosplenomegaly, no masses and bowel sounds normal   (female): normal female external genitalia, normal urethral meatus, vaginal mucosa pink, moist, well rugated, and normal cervix/adnexa/uterus without masses or discharge  MS: no gross musculoskeletal defects noted, no edema  SKIN: no suspicious lesions or rashes  NEURO: Normal strength and tone, mentation intact and speech normal  PSYCH: mentation appears normal, affect normal/bright    Diagnostic Test Results:  none     ASSESSMENT/PLAN:   1. Encounter for routine adult medical exam with abnormal findings  Follow up pending above results. Encouraged healthy diet and regular exercise, monthy SBE, and yearly exams.  - Lipid panel reflex to direct LDL Fasting    2. Papanicolaou smear of cervix with low grade squamous intraepithelial lesion (LGSIL)  - Pap imaged thin layer diagnostic with HPV (select HPV order below)  - HPV High Risk Types DNA Cervical    3. HSV (herpes simplex virus) infection  Refills of below medications for stable chronic conditions.  - valACYclovir (VALTREX) 500 MG tablet; Take 1 tablet (500 mg) by mouth daily  Dispense: 90 tablet; Refill: 3    4. Symptomatic menopausal or female climacteric states  Decrease dose as directed  - estradiol (VIVELLE-DOT) 0.0375 MG/24HR BIW patch; Place 1 patch onto the skin twice a week  Dispense: 8 patch; Refill: 1    5. Weight gain  - TSH with free T4 reflex    6. Breast  "tenderness  7. Visit for screening mammogram  L breast, noted on exam  Diagnostic imaging ordered on the L, screening on the R.  - MA Screen Right w/Ebenezer; Future    COUNSELING:  Reviewed preventive health counseling, as reflected in patient instructions    BP Readings from Last 1 Encounters:   11/07/18 116/68     Estimated body mass index is 25.22 kg/(m^2) as calculated from the following:    Height as of this encounter: 5' 3\" (1.6 m).    Weight as of this encounter: 142 lb 6 oz (64.6 kg).           reports that she has never smoked. She has never used smokeless tobacco.      Counseling Resources:  ATP IV Guidelines  Pooled Cohorts Equation Calculator  Breast Cancer Risk Calculator  FRAX Risk Assessment  ICSI Preventive Guidelines  Dietary Guidelines for Americans, 2010  USDA's MyPlate  ASA Prophylaxis  Lung CA Screening    Cheyenne Snider PA-C  [facility]  Answers for HPI/ROS submitted by the patient on 11/4/2018   PHQ-2 Score: 0    "

## 2018-11-07 NOTE — LETTER
January 8, 2019      Mariely ACE Hever  [address]  [place] [address]    Dear MsIno,      At [facility], your health and wellness is our primary concern. That is why we are following up on an abnormal pap from 11/7/18, which was reported as ASC-H and positive for high risk HPV. Your provider had recommended that you have a Colposcopy  completed by 2/7/19. Our records do not show that this has been scheduled.    It is important to complete the follow up that your provider has suggested for you to ensure that there are no worsening changes which may, over time, develop into cancer.      Please contact our [facility] Clinic at  [phone] to schedule an appointment for a Colposcopy (this cannot be scheduled through [facility]) at your earliest convenience. If you have questions or concerns, please call the clinic and we will be happy to assist you.    If you have completed the tests outside of [facility], please have the results forwarded to our office. We will update the chart for your primary Physician to review before your next annual physical.     Thank you for choosing [facility]!    Sincerely,      Cheyenne Snider PA-C/juan

## 2018-11-08 ENCOUNTER — RADIANT APPOINTMENT (OUTPATIENT)
Dept: ULTRASOUND IMAGING | Facility: CLINIC | Age: 53
End: 2018-11-08
Attending: PHYSICIAN ASSISTANT
Payer: COMMERCIAL

## 2018-11-08 ENCOUNTER — RADIANT APPOINTMENT (OUTPATIENT)
Dept: MAMMOGRAPHY | Facility: CLINIC | Age: 53
End: 2018-11-08
Attending: PHYSICIAN ASSISTANT
Payer: COMMERCIAL

## 2018-11-08 DIAGNOSIS — N64.4 BREAST TENDERNESS: ICD-10-CM

## 2018-11-08 LAB
CHOLEST SERPL-MCNC: 220 MG/DL
HDLC SERPL-MCNC: 89 MG/DL
LDLC SERPL CALC-MCNC: 118 MG/DL
NONHDLC SERPL-MCNC: 131 MG/DL
TRIGL SERPL-MCNC: 64 MG/DL
TSH SERPL DL<=0.005 MIU/L-ACNC: 0.9 MU/L (ref 0.4–4)

## 2018-11-08 PROCEDURE — 76642 ULTRASOUND BREAST LIMITED: CPT | Mod: LT

## 2018-11-08 PROCEDURE — 77066 DX MAMMO INCL CAD BI: CPT

## 2018-11-08 PROCEDURE — G0279 TOMOSYNTHESIS, MAMMO: HCPCS

## 2018-11-12 LAB
COPATH REPORT: ABNORMAL
PAP: ABNORMAL

## 2018-11-14 LAB
FINAL DIAGNOSIS: ABNORMAL
HPV HR 12 DNA CVX QL NAA+PROBE: POSITIVE
HPV16 DNA SPEC QL NAA+PROBE: NEGATIVE
HPV18 DNA SPEC QL NAA+PROBE: NEGATIVE
SPECIMEN DESCRIPTION: ABNORMAL
SPECIMEN SOURCE CVX/VAG CYTO: ABNORMAL

## 2018-11-14 NOTE — PROGRESS NOTES
7/19/13 pap LSIL. Plan-- colposcopy within 3 months  8/28/13 colposcopy.  Pap- LSIL/neg HR HPV. BX: confirmed LSIL/cannot rule out HSIL.  Plan:Reviewed all results and discussed with GYN.  Guidelines suggest repeat pap and HPV in 12 months. (due 8/28/14) reminder in Epic  Could be more conservative if patient prefers and repeat in 6months  9/10/14 pap LSIL/+ HR HPV (31). Plan-- colp  10/14/14 colp/cancelled  11/14/14 reminder call today  12/10/14 patient lost to pap follow up.  See 9/22/14 my chart for details. HM @ 6 months for repeat pap.  2/24/15 pap LSIL/+ HR HPV. Plan: colposcopy  4/1/15 colposcopy. ECC WNL. Exam limited and gyn consult recommended  4/30/15 gyn consult. Cancelled  5/26/15 reminder my chart message sent.  06/25/15 [place] - KRIS I. Plan cotest pap & HPV in 1 year  03/28/17 Pap reminder letter printed and sent. ([facility])  5/11/17 Appt Scheduled (rlm)  05/11/17: [facility] pap, Endometrial cells present, LMP was 04/16/13 + HR HPV (not 16 or 18) result. Plan [place] and Endometrial Bx per provider. (sk)  05/18/17: I called the pt and left a message for the pt to call me back. (sk)  05/23/17: I called the pt and she was informed of the results and recommendations for a [place] and Endometrial Bx due by 08/11/17. (sk)  07/13/17: [place] Bx Normal, ECC and Endometrial Bx  Atypical squamous epithelial cells, cannot exclude high grade squamousintraepithelial lesion with benign endocervical cells. Plan pap and ECC in 6 months per provider. Provider informed the pt via [email or web address], the pt viewed this [email or web address] message.   12/28/17 Pap and ECC reminder letter sent (rlm). Patient read on 12/30/17.  3/16/18 Reminder call, left msg.  3/19/18 Patient returned call to RN. She agrees to call and set up pap with ECC soon. ([facility])  06/20/18 Patient is lost to pap tracking follow-up. FYI routed to provider. ([facility])  11/7/18 ASC-H pap, + HR HPV (not 16 or 18). Plan: colp   11/19/18 left msg ([facility])  11/27/18 left msg and sent my chart result msg  ([facility]) Patent read My Chart on 11/30/18 ([facility])  1/8/19 My Chart Colposcopy Reminder message sent ([facility])  2/7/19 3mo [place] not done, updated to 6mo [place]/Pap due by 5/7/19 ([facility]). FYI sent to provider. ([facility])  04/23/19 MyChart colp/pap reminder message sent. ([facility])  04/30/19 MyChart not read, letter sent. ([facility])  5/24/19 Pap Follow up reminder call placed, voicemail left ([facility])  6/23/19 Patient is lost to follow-up. Routed to provider as FYI. ([facility])

## 2018-11-18 ENCOUNTER — MYC MEDICAL ADVICE (OUTPATIENT)
Dept: FAMILY MEDICINE | Facility: CLINIC | Age: 53
End: 2018-11-18

## 2018-11-18 DIAGNOSIS — N95.1 SYMPTOMATIC MENOPAUSAL OR FEMALE CLIMACTERIC STATES: ICD-10-CM

## 2018-11-19 RX ORDER — ESTRADIOL 0.04 MG/D
1 PATCH, EXTENDED RELEASE TRANSDERMAL
Qty: 8 PATCH | Refills: 2 | Status: SHIPPED | OUTPATIENT
Start: 2018-11-19 | End: 2018-11-26

## 2018-11-19 NOTE — TELEPHONE ENCOUNTER
"Chart reviewed, and patient saw PCP on 11/7/18 with note pasted below and no specific follow-up recommendation. Prescription approved per [facility] Refill Protocol, and patient notified via [employer].     \"4. Symptomatic menopausal or female climacteric states  Decrease dose as directed  - estradiol (VIVELLE-DOT) 0.0375 MG/24HR BIW patch; Place 1 patch onto the skin twice a week  Dispense: 8 patch; Refill: 1\"    Maribel Gomez RN    Requested Prescriptions   Pending Prescriptions Disp Refills     estradiol (VIVELLE-DOT) 0.0375 MG/24HR BIW patch 8 patch 1     Sig: Place 1 patch onto the skin twice a week    Hormone Replacement Therapy Passed    11/19/2018 10:25 AM       Passed - Blood pressure under 140/90 in past 12 months    BP Readings from Last 3 Encounters:   11/07/18 116/68   04/01/18 108/67   03/15/18 101/78                Passed - Recent (12 mo) or future (30 days) visit within the authorizing provider's specialty    Patient had office visit in the last 12 months or has a visit in the next 30 days with authorizing provider or within the authorizing provider's specialty.  See \"Patient Info\" tab in inbasket, or \"Choose Columns\" in Meds & Orders section of the refill encounter.             Passed - Patient has mammogram in past 2 years on file if age 50-75       Passed - Patient is 18 years of age or older       Passed - No active pregnancy on record       Passed - No positive pregnancy test on record in past 12 months          "

## 2018-12-19 ENCOUNTER — MYC MEDICAL ADVICE (OUTPATIENT)
Dept: FAMILY MEDICINE | Facility: CLINIC | Age: 53
End: 2018-12-19

## 2019-02-27 ENCOUNTER — MYC MEDICAL ADVICE (OUTPATIENT)
Dept: FAMILY MEDICINE | Facility: CLINIC | Age: 54
End: 2019-02-27

## 2019-04-17 ENCOUNTER — MYC MEDICAL ADVICE (OUTPATIENT)
Dept: FAMILY MEDICINE | Facility: CLINIC | Age: 54
End: 2019-04-17

## 2019-04-18 NOTE — TELEPHONE ENCOUNTER
Cheyenne-    Please see [employer] message. Are you willing to refill her progesterone? Pharmacy t'd up.    Thank you,  Yasmeen Cherry RN

## 2019-04-19 NOTE — TELEPHONE ENCOUNTER
I don't typically prescribe this dose so I am going to consult with OBGYN next week to determine if doses are appropriate and for how long she should be on this regimen.    Thanks  Cheyenne Snider PA-C

## 2019-04-22 ENCOUNTER — MYC MEDICAL ADVICE (OUTPATIENT)
Dept: FAMILY MEDICINE | Facility: CLINIC | Age: 54
End: 2019-04-22

## 2019-04-23 NOTE — TELEPHONE ENCOUNTER
Routed to pcp last note on 4/17/19 stated you would consult with OB, have you heard back will you refill estrogen?  Maria Dolores Cloud,Clinic Rn  [place]

## 2019-04-23 NOTE — TELEPHONE ENCOUNTER
I would recommend that she follow up with the provider that prescribed this originally.    Thanks  Cheyenne Snider PA-C

## 2019-04-29 NOTE — TELEPHONE ENCOUNTER
This was actually a duplicate message. I recommended that she return to the prescribing provider.    Cheyenne Snider PA-C

## 2019-05-07 ENCOUNTER — MYC REFILL (OUTPATIENT)
Dept: FAMILY MEDICINE | Facility: CLINIC | Age: 54
End: 2019-05-07

## 2019-05-07 ENCOUNTER — OFFICE VISIT (OUTPATIENT)
Dept: FAMILY MEDICINE | Facility: CLINIC | Age: 54
End: 2019-05-07
Payer: COMMERCIAL

## 2019-05-07 VITALS
OXYGEN SATURATION: 96 % | HEART RATE: 88 BPM | TEMPERATURE: 97.7 F | DIASTOLIC BLOOD PRESSURE: 78 MMHG | HEIGHT: 63 IN | BODY MASS INDEX: 26.93 KG/M2 | SYSTOLIC BLOOD PRESSURE: 138 MMHG | WEIGHT: 152 LBS

## 2019-05-07 DIAGNOSIS — T78.40XA ALLERGIC REACTION TO DRUG, INITIAL ENCOUNTER: ICD-10-CM

## 2019-05-07 DIAGNOSIS — J20.9 ACUTE BRONCHITIS WITH SYMPTOMS > 10 DAYS: Primary | ICD-10-CM

## 2019-05-07 DIAGNOSIS — N95.1 MENOPAUSAL SYNDROME (HOT FLASHES): ICD-10-CM

## 2019-05-07 PROCEDURE — 99213 OFFICE O/P EST LOW 20 MIN: CPT | Performed by: NURSE PRACTITIONER

## 2019-05-07 RX ORDER — AZITHROMYCIN 250 MG/1
TABLET, FILM COATED ORAL
Qty: 6 TABLET | Refills: 0 | Status: SHIPPED | OUTPATIENT
Start: 2019-05-07 | End: 2019-08-23

## 2019-05-07 ASSESSMENT — MIFFLIN-ST. JEOR: SCORE: 1263.6

## 2019-05-07 NOTE — PROGRESS NOTES
SUBJECTIVE:   Mariely Kathleen is a 53 year old female who presents to clinic today for the following   health issues:        Acute Illness   Acute illness concerns: cough with some blood noticed in the past few days  Onset: 2-3 weeks    Fever: no    Chills/Sweats: no    Headache (location?): no    Sinus Pressure:YES    Conjunctivitis:  no    Ear Pain: no    Rhinorrhea: YES    Congestion: YES    Sore Throat: no     Cough: YES-productive of yellow sputum, productive of green sputum and some blood noticed    Wheeze: no    Decreased Appetite: no    Nausea: no    Vomiting: no    Diarrhea:  no    Dysuria/Freq.: no    Fatigue/Achiness: no    Sick/Strep Exposure: YES- work      Therapies Tried and outcome: Mucinex, Cough Syrup       Reports an allergy to mucinex developed a rash on her simmons surface of her left hand and had associated tingling around her mouth in the absence of respiratory compromise.     Additional history: as documented    Reviewed  and updated as needed this visit by clinical staff  Tobacco  Allergies  Meds  Med Hx  Surg Hx  Fam Hx  Soc Hx        Reviewed and updated as needed this visit by Provider         Patient Active Problem List   Diagnosis     CARDIOVASCULAR SCREENING; LDL GOAL LESS THAN 160     Papanicolaou smear of cervix with low grade squamous intraepithelial lesion (LGSIL)     Family history of premature CAD     Genital herpes     Acute appendicitis     Past Surgical History:   Procedure Laterality Date     COLONOSCOPY WITH CO2 INSUFFLATION N/A 7/18/2017    Procedure: COLONOSCOPY WITH CO2 INSUFFLATION;  COLON SCREEN/ RIVERS;  Surgeon: Jeermy Moran MD;  Location: [facility] OR     LAPAROSCOPIC APPENDECTOMY N/A 3/14/2018    Procedure: LAPAROSCOPIC APPENDECTOMY;  laparoscopic appendectomy;  Surgeon: Agueda Torres MD;  Location: [facility]     ORTHOPEDIC SURGERY      Bunion surgery left foot       Social History     Tobacco Use     Smoking status: Never Smoker     Smokeless  "tobacco: Never Used   Substance Use Topics     Alcohol use: Yes     Comment: 1 time per week     Family History   Problem Relation Age of Onset     Hypertension Mother      Hyperlipidemia Mother      C.A.D. Father 58        MI, stents     Hypertension Father      Coronary Artery Disease Father      C.A.D. Brother 50        MI     Hypertension Brother      Diabetes No family hx of      Breast Cancer No family hx of      Cancer - colorectal No family hx of            ROS:  Constitutional, HEENT, cardiovascular, pulmonary, GI, , musculoskeletal, neuro, skin, endocrine and psych systems are negative, except as otherwise noted.    OBJECTIVE:     /78   Pulse 88   Temp 97.7  F (36.5  C) (Oral)   Ht 1.6 m (5' 3\")   Wt 68.9 kg (152 lb)   LMP 04/16/2013   SpO2 96%   BMI 26.93 kg/m    Body mass index is 26.93 kg/m .  GENERAL: healthy, alert and no distress  EYES: Eyes grossly normal to inspection, PERRL and conjunctivae and sclerae normal  HENT: ear canals and TM's normal, nose and mouth without ulcers or lesions  NECK: no adenopathy, no asymmetry, masses, or scars and thyroid normal to palpation  RESP: lungs clear to auscultation - no rales, rhonchi or wheezes  CV: regular rate and rhythm, normal S1 S2, no S3 or S4, no murmur, click or rub, no peripheral edema and peripheral pulses strong  ABDOMEN: soft, nontender, no hepatosplenomegaly, no masses and bowel sounds normal  MS: no gross musculoskeletal defects noted, no edema    Diagnostic Test Results:  none     ASSESSMENT/PLAN:     1. Acute bronchitis with symptoms > 10 days  Discussed viral vs bacterial etiology pt prefers to start antibiotics given it's been 3 weeks.   - azithromycin (ZITHROMAX) 250 MG tablet; Two tablets first day, then one tablet daily for four days.  Dispense: 6 tablet; Refill: 0    2. Allergic reaction to drug, initial encounter  Reports rash on simmons aspect of left hand + tingling around mouth after 3 doses of mucinex. Reviewed " medication s/e profile and this is not a common occurrence with medication. Allergies have been updated however.     KATIE Young [facility]

## 2019-05-08 RX ORDER — ESTRADIOL 0.05 MG/D
1 PATCH, EXTENDED RELEASE TRANSDERMAL
Qty: 24 PATCH | Refills: 3 | OUTPATIENT
Start: 2019-05-09

## 2019-05-08 NOTE — TELEPHONE ENCOUNTER
"Requested Prescriptions   Pending Prescriptions Disp Refills     estradiol (VIVELLE-DOT) 0.05 MG/24HR bi-weekly patch  Last Written Prescription Date:  11/26/2018  Last Fill Quantity: 24 patch,  # refills: 3   Last office visit: 5/7/2019 with prescribing provider:  Christian   Future Office Visit:     24 patch 3     Sig: Place 1 patch onto the skin twice a week       Hormone Replacement Therapy Passed - 5/8/2019  8:47 AM        Passed - Blood pressure under 140/90 in past 12 months     BP Readings from Last 3 Encounters:   05/07/19 138/78   11/07/18 116/68   04/01/18 108/67                 Passed - Recent (12 mo) or future (30 days) visit within the authorizing provider's specialty     Patient had office visit in the last 12 months or has a visit in the next 30 days with authorizing provider or within the authorizing provider's specialty.  See \"Patient Info\" tab in inbasket, or \"Choose Columns\" in Meds & Orders section of the refill encounter.              Passed - Patient has mammogram in past 2 years on file if age 50-75        Passed - Medication is active on med list        Passed - Patient is 18 years of age or older        Passed - No active pregnancy on record        Passed - No positive pregnancy test on record in past 12 months          "

## 2019-05-10 ENCOUNTER — MYC MEDICAL ADVICE (OUTPATIENT)
Dept: FAMILY MEDICINE | Facility: CLINIC | Age: 54
End: 2019-05-10

## 2019-05-10 DIAGNOSIS — R06.2 WHEEZING: Primary | ICD-10-CM

## 2019-05-10 RX ORDER — ALBUTEROL SULFATE 90 UG/1
2 AEROSOL, METERED RESPIRATORY (INHALATION) EVERY 6 HOURS PRN
Qty: 8.5 G | Refills: 0 | Status: SHIPPED | OUTPATIENT
Start: 2019-05-10

## 2019-05-10 NOTE — TELEPHONE ENCOUNTER
Route pended albuterol inhaler order to provider for approval as discussed.    Patient seen by Lilly Gonzales CNP on 5/7/19, prescribed Zithromax for acute bronchitis.  Per visit note, she presented with sx of cough, sinus pressure, runny nose, congestion, but no wheeze.  Patient reports taking Z-Pack as prescribed, still has a lingering cough with mostly clear mucus with a bit of yellow/green from time to time, as well as a bit of wheezing, which she feels she had at provider visit as well, and is not worsening.  She reports using steam room at gym to help with secretions.  We also discussed hydration and saline nasal spray.  Patient denies severe wheeze or feelings of suffocation, fever, denies allergies and does not appear to have hx of asthma. She states she used an albuterol inhaler in the past, feels this would help with her sx and is wondering if provider will prescribe for her.     Huddled with Lilly Gonzales, and she is willing to prescribe inhaler, would like patient to take try Mucinex as well as discussed at visit.  Patient notified of above and instructed to make appointment in clinic next week for worsening or non-resolving sx.     Maribel Gomez RN

## 2019-05-16 ENCOUNTER — HEALTH MAINTENANCE LETTER (OUTPATIENT)
Age: 54
End: 2019-05-16

## 2019-05-24 ENCOUNTER — TELEPHONE (OUTPATIENT)
Dept: FAMILY MEDICINE | Facility: CLINIC | Age: 54
End: 2019-05-24

## 2019-05-24 DIAGNOSIS — R87.612 PAPANICOLAOU SMEAR OF CERVIX WITH LOW GRADE SQUAMOUS INTRAEPITHELIAL LESION (LGSIL): ICD-10-CM

## 2019-05-24 NOTE — TELEPHONE ENCOUNTER
Pt is past due for Pap follow up  Reminder letter has been sent  LMTC her clinic with any questions or to schedule    Zena Whitten,   Pap Tracking

## 2019-08-23 ENCOUNTER — OFFICE VISIT (OUTPATIENT)
Dept: FAMILY MEDICINE | Facility: CLINIC | Age: 54
End: 2019-08-23
Payer: COMMERCIAL

## 2019-08-23 VITALS
BODY MASS INDEX: 27.29 KG/M2 | HEART RATE: 74 BPM | DIASTOLIC BLOOD PRESSURE: 74 MMHG | WEIGHT: 154 LBS | TEMPERATURE: 98.1 F | SYSTOLIC BLOOD PRESSURE: 128 MMHG | HEIGHT: 63 IN

## 2019-08-23 DIAGNOSIS — R41.840 INATTENTION: Primary | ICD-10-CM

## 2019-08-23 PROCEDURE — 99214 OFFICE O/P EST MOD 30 MIN: CPT | Performed by: NURSE PRACTITIONER

## 2019-08-23 ASSESSMENT — MIFFLIN-ST. JEOR: SCORE: 1272.67

## 2019-08-23 NOTE — PATIENT INSTRUCTIONS
[facility]   Discharged by : Luli Dick MA    Paper scripts provided to patient : no     If you have any questions regarding your visit please contact your care team:     Team Gold                Clinic Hours Telephone Number     Dr. Belinda Gonzales, CNP 7am-7pm  Monday - Thursday   7am-5pm  Fridays  [phone]   (Appointment scheduling available 24/7)     RN Line  [phone] option 2     Urgent Care - [place] and [place] - 11am-9pm Monday-Friday Saturday-Sunday- 9am-5pm     [place] -   5pm-9pm Monday-Friday Saturday-Sunday- 9am-5pm    [phone] - [place]    [phone] - [place]     For a Price Quote for your services, please call our [employer] Price Line at [phone].     What options do I have for visits at the clinic other than the traditional office visit?     To expand how we care for you, many of our providers are utilizing electronic visits (e-visits) and telephone visits, when medically appropriate, for interactions with their patients rather than a visit in the clinic. We also offer nurse visits for many medical concerns. Just like any other service, we will bill your insurance company for this type of visit based on time spent on the phone with your provider. Not all insurance companies cover these visits. Please check with your medical insurance if this type of visit is covered. You will be responsible for any charges that are not paid by your insurance.   E-visits via [employer]: generally incur a $45.00 fee.     Telephone visits:  Time spent on the phone: *charged based on time that is spent on the phone in increments of 10 minutes. Estimated cost:   5-10 mins $30.00   11-20 mins. $59.00   21-30 mins. $85.00     Use "[employer]t (secure email communication and access to your chart) to send your primary care provider a message or make an appointment. Ask someone on your Team how to sign up for "[employer]t.     As  always, Thank you for trusting us with your health care needs!    [facility]:    Scheduling Appointments  [facility]  Call: [phone]    Kalyan Jeff [facility]  Call: [phone]    [facility]  Call: [phone]    For Gastroenterology referrals   [facility]   Clinics and Surgery Center, 4th Floor   [address] [place] [address]   Appointments: [phone]    WHERE TO GO FOR CARE?  Clinic    Make an appointment if you:       Are sick (cold, cough, flu, sore throat, earache or in pain).       Have a small injury (sprain, small cut, burn or broken bone).       Need a physical exam, Pap smear, vaccine or prescription refill.       Have questions about your health or medicines.    To reach us:      Call [phone] ([phone]). Open 24 hours every day. (For counseling services, call [phone].)    Log into [facility]. (Visit [email or web address] to create an account.) Hospital emergency room    An emergency is a serious or life- threatening problem that must be treated right away.    Call 344 or get to the hospital if you have:      Very bad or sudden:            - Chest pain or pressure         - Bleeding         - Head or belly pain         - Dizziness or trouble seeing, walking or                          Speaking      Problems breathing      Blood in your vomit or you are coughing up blood      A major injury (knocked out, loss of a finger or limb, rape, broken bone protruding from skin)    A mental health crisis. (Or call the Mental Health Crisis line at 1-358.538.9812 or Suicide Prevention Hotline at 1-974.298.8744.)    Open 24 hours every day. You don't need an appointment.     Urgent care    Visit urgent care for sickness or small injuries when the clinic is closed. You don't need an appointment. To check hours or find an urgent care near you, visit  [email or web address]. Online care    Get online care from [facility] for more than 70 common problems, like colds, allergies and infections. Open 24 hours every day at:   [email or web address]   Need help deciding?    For advice about where to be seen, you may call your clinic and ask to speak with a nurse. We're here for you 24 hours every day.         If you are deaf or hard of hearing, please let us know. We provide many free services including sign language interpreters, oral interpreters, TTYs, telephone amplifiers, note takers and written materials.

## 2019-08-23 NOTE — PROGRESS NOTES
"Subjective     Marielyjamar Kathleen is a 53 year old female who presents to clinic today for the following health issues:    HPI   Concern - would like to try adderall  Onset: life long issue, even back in . Teachers would tell her she wasn't paying attention or listening well, always struggled with focus very often.     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   Patient Has  Not done therapy or pysch visits and never been treated with medications for ADHD in the past.   Presents to clinic to discuss her symptoms of inattentiveness, decreased focus, absent-minded this.  She works full-time in HR and says that she is beginning to notice the symptoms more frequently and when she looks back at her life and speaks with her mother she says she has a long history of inattentiveness in school, lack of focus.  She wonders if she carries a diagnosis of ADHD which she has been managing without medications up until now.  She would like testing.  Says she sleeps about 7 1/2 hours a night and it has restful sleep.  Denies history of anxiety depression.      Patient Active Problem List   Diagnosis     CARDIOVASCULAR SCREENING; LDL GOAL LESS THAN 160     Papanicolaou smear of cervix with low grade squamous intraepithelial lesion (LGSIL)     Family history of premature CAD     Genital herpes     Acute appendicitis     Past Surgical History:   Procedure Laterality Date     COLONOSCOPY WITH CO2 INSUFFLATION N/A 7/18/2017    Procedure: COLONOSCOPY WITH CO2 INSUFFLATION;  COLON SCREEN/ RIVERS;  Surgeon: Jeremy Moran MD;  Location:  OR     LAPAROSCOPIC APPENDECTOMY N/A 3/14/2018    Procedure: LAPAROSCOPIC APPENDECTOMY;  laparoscopic appendectomy;  Surgeon: Agueda Torres MD;  [facility]     ORTHOPEDIC SURGERY      Bunion surgery left foot     " "  Social History     Tobacco Use     Smoking status: Never Smoker     Smokeless tobacco: Never Used   Substance Use Topics     Alcohol use: Yes     Comment: On occasion     Family History   Problem Relation Age of Onset     Hypertension Mother      Hyperlipidemia Mother      C.A.D. Father 58        MI, stents     Hypertension Father      Coronary Artery Disease Father      C.A.D. Brother 50        MI     Hypertension Brother      Diabetes No family hx of      Breast Cancer No family hx of      Cancer - colorectal No family hx of            Reviewed and updated as needed this visit by Provider         Review of Systems   ROS COMP: Constitutional, HEENT, cardiovascular, pulmonary, GI, , musculoskeletal, neuro, skin, endocrine and psych systems are negative, except as otherwise noted.      Objective    /74   Pulse 74   Temp 98.1  F (36.7  C) (Oral)   Ht 1.6 m (5' 3\")   Wt 69.9 kg (154 lb)   LMP 04/16/2013   BMI 27.28 kg/m    Body mass index is 27.28 kg/m .  Physical Exam   GENERAL: healthy, alert and no distress  EYES: Eyes grossly normal to inspection, PERRL and conjunctivae and sclerae normal  NECK: no adenopathy, no asymmetry, masses, or scars and thyroid normal to palpation  RESP: lungs clear to auscultation - no rales, rhonchi or wheezes  CV: regular rate and rhythm, normal S1 S2, no S3 or S4, no murmur, click or rub, no peripheral edema and peripheral pulses strong  ABDOMEN: soft, nontender, no hepatosplenomegaly, no masses and bowel sounds normal  MS: no gross musculoskeletal defects noted, no edema  PSYCH: mentation appears normal, affect normal/bright    Diagnostic Test Results:  Labs reviewed in Epic  none         Assessment & Plan     1. Inattention  Long history of inattention, lack of focus, difficulty completing task to completion.  Patient works full-time in  and wonders about a possible diagnosis of ADHD and medication.  I think she is a very good candidate for ADHD testing.  Once " "testing completed I recommend she return to discuss results +/- medication if appropriate.  - MENTAL HEALTH REFERRAL  - Adult; Assessments and Testing; ADHD; [facility]: [facility] [phone]; We will contact you to schedule the appointment or please call with any questions     BMI:   Estimated body mass index is 27.28 kg/m  as calculated from the following:    Height as of this encounter: 1.6 m (5' 3\").    Weight as of this encounter: 69.9 kg (154 lb).   Weight management plan: Discussed healthy diet and exercise guidelines          No follow-ups on file.    KATIE Young [facility]    "

## 2019-09-23 ENCOUNTER — NURSE TRIAGE (OUTPATIENT)
Dept: NURSING | Facility: CLINIC | Age: 54
End: 2019-09-23

## 2019-09-23 NOTE — TELEPHONE ENCOUNTER
Muscles on left side of face is not working properly.  Numbness is now moving to eye.  Symptoms started yesterday.      Reason for Disposition    [1] Numbness (i.e., loss of sensation) of the face, arm / hand, or leg / foot on one side of the body AND [2] sudden onset AND [3] present now    Additional Information    Negative: [1] SEVERE weakness (i.e., unable to walk or barely able to walk, requires support) AND [2] new onset or worsening    Negative: [1] Weakness (i.e., paralysis, loss of muscle strength) of the face, arm / hand, or leg / foot on one side of the body AND [2] sudden onset AND [3] present now    Protocols used: NEUROLOGIC DEFICIT-A-AH

## 2019-09-25 ENCOUNTER — TELEPHONE (OUTPATIENT)
Dept: FAMILY MEDICINE | Facility: CLINIC | Age: 54
End: 2019-09-25

## 2019-09-25 DIAGNOSIS — G51.0 BELL'S PALSY: Primary | ICD-10-CM

## 2019-09-25 NOTE — TELEPHONE ENCOUNTER
Reason for Call:  Other prescription    Detailed comments: Patient called in to let the care team know that she was diagnosed with Bell's Palsy and that she was told that she needs a Predisone prescription and she would like to discuss this with an RN. She would like a call back as soon as possible.     Phone Number Patient can be reached at: Cell number on file:    Telephone Information:   Mobile [phone]       Best Time: As soon as possible    Can we leave a detailed message on this number? YES    Call taken on 9/25/2019 at 4:26 PM by John Paul Wilhelm

## 2019-09-26 RX ORDER — PREDNISONE 20 MG/1
TABLET ORAL
Qty: 21 TABLET | Refills: 0 | Status: SHIPPED | OUTPATIENT
Start: 2019-09-26 | End: 2019-11-01

## 2019-09-26 NOTE — TELEPHONE ENCOUNTER
Reached out to patient and left a detailed voicemail to relay prescription information. Instructed patient to call the RN line back if there are any questions. May close encounter if read with no reply.     Lexy Chapman RN

## 2019-09-26 NOTE — TELEPHONE ENCOUNTER
Routing to provider to please advise. Patient was diagnosed in the emergency room with Bell's palsy and did not receive a prescription for steroids. Patient is asking if there is any way to get a prescription now to start treatment sooner.     Patient is scheduled for a follow up appointment with you on 10/4/19.     Lexy Chapman RN

## 2019-09-27 DIAGNOSIS — G51.0 BELL'S PALSY: ICD-10-CM

## 2019-09-27 RX ORDER — PREDNISONE 20 MG/1
TABLET ORAL
Qty: 21 TABLET | Refills: 0 | Status: CANCELLED | OUTPATIENT
Start: 2019-09-27

## 2019-09-27 NOTE — TELEPHONE ENCOUNTER
Requested Prescriptions   Pending Prescriptions Disp Refills     predniSONE (DELTASONE) 20 MG tablet  Last Written Prescription Date:  9/26/19  Last Fill Quantity: 21,  # refills: 0   Last office visit: 8/23/2019 with prescribing provider:  Christian Patterson Office Visit:   Next 5 appointments (look out 90 days)    Oct 03, 2019  2:30 PM CDT  Colposcopy with Maegan Larkin MD, NE PROC RM OB/COLP  [facility] ([address] [id number] [phone]   Oct 04, 2019 12:20 PM CDT  SHORT with Toña Dodson DO  [facility] ([address] [id number] [phone]          21 tablet 0     Sig: Take 3 tabs by mouth daily x 7       There is no refill protocol information for this order

## 2019-09-27 NOTE — TELEPHONE ENCOUNTER
Prednisone Rx for treatment of Bell's Palsy was sent to [facility] yesterday AM.    Current request was sent this AM (9/27/19).    Did they not receive the Rx sent yesterday?      I called pharmacy, patient picked up the Rx, this was mistakenly put on auto fill.    Can disregard.    Penny Vieira RN  [facility]

## 2019-10-02 NOTE — PROGRESS NOTES
Mariely Kathleen is a 53 year old female  who presents for repeat colposcopy, referred by Cheyenne Snider PA-C. Pap smear on 2018 showed: ASC-H and with high risk HPV present: other. The prior pap showed ASCUS and with high risk HPV present: other.     13 pap LSIL. Plan-- colposcopy within 3 months  13 colposcopy.  Pap- LSIL/neg HR HPV. BX: confirmed LSIL/cannot rule out HSIL.  Plan: cotest in 12 mo or cotest in 6 mo.  9/10/14 pap LSIL/+ HR HPV (31). Plan-- colp  10/14/14 colp/cancelled  14 reminder call today  12/10/14 patient lost to pap follow up.  See 14 my chart for details. HM @ 6 months for repeat pap.  2/24/15 pap LSIL/+ HR HPV. Plan: colposcopy  4/1/15 colposcopy. Limited exam. ECC WNL. Plan to see GYN  4/30/15 gyn consult scheduled. Cancelled.  6/25/15: [place] - KRIS I. Plan cotest pap & HPV in 1 year  17: Ascus pap, Endometrial cells present, LMP was 13 + HR HPV (not 16 or 18) result. Plan [place] and Endometrial Bx per provider.  17: [place] Bx Normal, ECC and Endometrial Bx  Atypical squamous epithelial cells, cannot exclude high grade squamousintraepithelial lesion with benign endocervical cells. Plan pap and ECC in 6 months per provider, due 18.   18 Patient is lost to pap tracking follow-up.   18 ASC-H pap, + HR HPV (not 16 or 18). Plan: colp   19 Lost to follow-up for pap tracking        Patient's last menstrual period was 2013.  UPT today is not done  Patient does not smoke  Type of contraception: n/a  Age at first sexual intercourse: 17  Number of sexual partners (lifetime): 4  Past GYN history: HPV and HSV  Prior cervical/vaginal disease: HPV related changes and KRIS 1.  Prior cervical treatment: no treatment.      PROCEDURE:      Before the procedure, it was ensured that the patient was educated regarding the nature of her findings to date, the implications, and what was to be done. She has been made aware of the role of HPV,  the natural history of infection, ways to minimize her future risk, the effect of HPV on the cervix, and treatment options available should they be indicated. The details of the colposcopic procedure were reviewed. All questions were answered before proceeding, and informed consent was therefore obtained.      Speculum placed in vagina and excellent visualization of cervix acheived, cervix swabbed x 3 with acetic acid solution.      FINDINGS:  Cervix: no visible lesions  SCJ seen?: yes   ECC done?: Yes   Satisfactory examination?: no      ASSESSMENT: Normal exam without visible pathology.  PLAN: specimens labelled and sent to Pathology, will base further treatment on Pathology findings, post biopsy instructions given to patient, call to discuss Pathology results.      Maegan Larkin MD

## 2019-10-03 ENCOUNTER — OFFICE VISIT (OUTPATIENT)
Dept: FAMILY MEDICINE | Facility: CLINIC | Age: 54
End: 2019-10-03
Payer: COMMERCIAL

## 2019-10-03 ENCOUNTER — OFFICE VISIT (OUTPATIENT)
Dept: OBGYN | Facility: CLINIC | Age: 54
End: 2019-10-03
Payer: COMMERCIAL

## 2019-10-03 ENCOUNTER — RESULT FOLLOW UP (OUTPATIENT)
Dept: OBGYN | Facility: CLINIC | Age: 54
End: 2019-10-03

## 2019-10-03 VITALS
WEIGHT: 155 LBS | HEART RATE: 84 BPM | DIASTOLIC BLOOD PRESSURE: 86 MMHG | BODY MASS INDEX: 27.46 KG/M2 | HEIGHT: 63 IN | TEMPERATURE: 98.5 F | SYSTOLIC BLOOD PRESSURE: 124 MMHG

## 2019-10-03 VITALS
WEIGHT: 154.4 LBS | TEMPERATURE: 98.5 F | DIASTOLIC BLOOD PRESSURE: 77 MMHG | BODY MASS INDEX: 27.36 KG/M2 | OXYGEN SATURATION: 96 % | SYSTOLIC BLOOD PRESSURE: 137 MMHG | HEART RATE: 76 BPM | HEIGHT: 63 IN

## 2019-10-03 DIAGNOSIS — R87.610 PAPANICOLAOU SMEAR OF CERVIX WITH ATYPICAL SQUAMOUS CELLS OF UNDETERMINED SIGNIFICANCE (ASC-US): Primary | ICD-10-CM

## 2019-10-03 DIAGNOSIS — G51.0 BELL'S PALSY: Primary | ICD-10-CM

## 2019-10-03 DIAGNOSIS — R87.613 HSIL ON PAP SMEAR OF CERVIX: ICD-10-CM

## 2019-10-03 DIAGNOSIS — B00.9 HSV (HERPES SIMPLEX VIRUS) INFECTION: ICD-10-CM

## 2019-10-03 DIAGNOSIS — Z12.4 SCREENING FOR CERVICAL CANCER: ICD-10-CM

## 2019-10-03 PROCEDURE — 86618 LYME DISEASE ANTIBODY: CPT | Performed by: FAMILY MEDICINE

## 2019-10-03 PROCEDURE — 36415 COLL VENOUS BLD VENIPUNCTURE: CPT | Performed by: FAMILY MEDICINE

## 2019-10-03 PROCEDURE — 99214 OFFICE O/P EST MOD 30 MIN: CPT | Performed by: FAMILY MEDICINE

## 2019-10-03 PROCEDURE — 88342 IMHCHEM/IMCYTCHM 1ST ANTB: CPT | Performed by: OBSTETRICS & GYNECOLOGY

## 2019-10-03 PROCEDURE — 88341 IMHCHEM/IMCYTCHM EA ADD ANTB: CPT | Performed by: OBSTETRICS & GYNECOLOGY

## 2019-10-03 PROCEDURE — 88305 TISSUE EXAM BY PATHOLOGIST: CPT | Performed by: OBSTETRICS & GYNECOLOGY

## 2019-10-03 PROCEDURE — 57456 ENDOCERV CURETTAGE W/SCOPE: CPT | Performed by: OBSTETRICS & GYNECOLOGY

## 2019-10-03 PROCEDURE — 88175 CYTOPATH C/V AUTO FLUID REDO: CPT | Performed by: OBSTETRICS & GYNECOLOGY

## 2019-10-03 PROCEDURE — G0124 SCREEN C/V THIN LAYER BY MD: HCPCS | Performed by: OBSTETRICS & GYNECOLOGY

## 2019-10-03 PROCEDURE — 87624 HPV HI-RISK TYP POOLED RSLT: CPT | Performed by: OBSTETRICS & GYNECOLOGY

## 2019-10-03 RX ORDER — VALACYCLOVIR HYDROCHLORIDE 500 MG/1
500 TABLET, FILM COATED ORAL DAILY
Qty: 90 TABLET | Refills: 3 | Status: SHIPPED | OUTPATIENT
Start: 2019-10-03 | End: 2020-10-13

## 2019-10-03 RX ORDER — GABAPENTIN 100 MG/1
100 CAPSULE ORAL 3 TIMES DAILY
Qty: 60 CAPSULE | Refills: 1 | Status: SHIPPED | OUTPATIENT
Start: 2019-10-03 | End: 2019-11-01

## 2019-10-03 ASSESSMENT — MIFFLIN-ST. JEOR
SCORE: 1277.21
SCORE: 1274.48

## 2019-10-03 NOTE — PROGRESS NOTES
Subjective     Mariely Kathleen is a 53 year old female who presents to clinic today for the following health issues:    HPI   ED/UC Followup:  Patient notes facial pain, brain fog, double/blurred vision, light sensitivity, and sensitivity to loud noises, stating that by the end of a work day she is exhausted. Patient notes that her eyes have not been closing, and that it requires significant effort to close them.     She is having negative side-effects from the Prednisone. She is currently on 60 mg, 3 tablets daily (for 7 days), however she states that it caused hyperactivity and had self adjusted the dosage (down to 2 tablets), but notes that it was ineffective for treating pain.    She is interested in alternative methods to aid her nerve pain, such as liquid b-12 drops. Patient is also wondering is she needs to take time off of work. Tried acupuncture with little relief.     The patient had a cold prior to her bell's palsey diagnosis. She was not tested for lyme's disease; patient notes that she is a frequent golfer.    Recently visited eye specialist who said that she needs to continue wearing prescription glasses and ruled out a cornea tear.      Facility:  [place]  Date of visit: 9/23/19  Reason for visit: suspected bells palsy  Current Status: patient has lots of questions, the prednisone is working very well, but is wondering about gabapentin for the nerve pain.  She was not told anything at the ER about her diagnosis and has learned everything she knows from [employer] and a support group.         BP Readings from Last 3 Encounters:   10/03/19 124/86   08/23/19 128/74   05/07/19 138/78    Wt Readings from Last 3 Encounters:   10/03/19 70.3 kg (155 lb)   08/23/19 69.9 kg (154 lb)   05/07/19 68.9 kg (152 lb)         Reviewed and updated as needed this visit by Provider    Review of Systems   ROS COMP: Constitutional, HEENT, cardiovascular, pulmonary, gi and gu systems are negative, except as otherwise noted.    This  "document serves as a record of the services and decisions personally performed and made by oTña Dodson DO. It was created on her behalf by Jesus Manuel Aguillon, a trained medical scribe. The creation of this document is based on the provider's statements to the medical scribe.  Jesus Manuel Aguillon October 3, 2019 9:06 AM      Objective    /86 (Cuff Size: Adult Large)   Pulse 84   Temp 98.5  F (36.9  C) (Oral)   Ht 1.6 m (5' 3\")   Wt 70.3 kg (155 lb)   LMP 04/16/2013   BMI 27.46 kg/m    Body mass index is 27.46 kg/m .  Physical Exam   GENERAL: healthy, alert and no distress  EYES: Moderate to severe house-brackmann scale; 2-3 mm eyes opened  HENT: ear canals and TM's normal, nose and mouth without ulcers or lesions; Loss of line in forehead wrinkle; significant drooping of the left side of mouth  NECK: no adenopathy, no asymmetry, masses, or scars and thyroid normal to palpation  RESP: lungs clear to auscultation - no rales, rhonchi or wheezes  CV: regular rate and rhythm, normal S1 S2, no S3 or S4, no murmur, click or rub, no peripheral edema and peripheral pulses strong  MS: no gross musculoskeletal defects noted, no edema  SKIN: no suspicious lesions or rashes  NEURO: Normal strength and tone, mentation intact and speech normal  PSYCH: mentation appears normal, affect normal/bright    Diagnostic Test Results:  Labs reviewed in Epic        Assessment & Plan       ICD-10-CM    1. Bell's palsy G51.0 gabapentin (NEURONTIN) 100 MG capsule     Lyme Disease Gracie with reflex to WB Serum   Patient has moderate to severe bells palsey, she has a follow-up with her neurologist tomorrow. Labs are being run to test her for lyme disease since she is a frequent golfer. Additionally, I have prescribed gabapentin and she is on prednisone as treatment for her nerve pain. She has been educated about the pathology of this illness. Patient is to follow-up with the clinic or ER for any new or worsening symptoms.       BMI:   Estimated body " "mass index is 27.46 kg/m  as calculated from the following:    Height as of this encounter: 1.6 m (5' 3\").    Weight as of this encounter: 70.3 kg (155 lb).   Weight management plan: Discussed healthy diet and exercise guidelines    Patient Instructions   I will prescribed gabapentin to help with the nerve pain. We will also run blood work to test you for lyme's disease.    Follow-up with your neurologist.   Patient Education     Dahl s Palsy    Bell's Palsy is a problem involving the nerve that controls the muscles on one side of the face.  In most cases, the cause is unknown, but may be related to inflammation of the nerve, diabetes, pregnancy, Lyme disease, and viral infections such as herpes or varicella. Symptoms usually appear only on one side. They may include:    Inability to close the eyelid    Tearing of the eye    Facial drooping    Drooling    Numbness or pain    Changes in taste    Sound sensitivity  Damage to the eye can be a serious problem. The inability to blink can cause the eye to dry out. An ulcer (sore) can then form on the cornea. Also, not blinking means that the eye has no protection from dirt and dust particles.  Treatment involves protecting and moistening the eye. Medicines, such as steroids, may also help.  Most people recover fully within 3 to 6 months. However, the condition sometimes returns months or years later.  Home care    Get plenty of rest and eat a healthy diet to help yourself recover.    Use artificial tears often during the day and at bedtime to prevent drying. These drops are available without prescription at your drug store.    Wear protective glasses especially when outside to protect from flying debris. Use sunglasses when outdoors.    Tape the eyelid closed at bedtime with a paper tape (available at your pharmacy). It has a very mild adhesive so won't injure the lid. This will protect your eye from injury while you sleep.    Sometimes medicines are prescribed to reduce " inflammation or treat specific viral infections of the nerve. If medicines are prescribed, take them exactly as directed. Usually the sooner the medicines are started, the more effective they are. Taking this medicine as prescribed will help with a full recovery.    Use low heat, for example from a heating pad, on the affected area. This can help reduce pain and swelling.    If you have severe pain, contact your healthcare provider.  Follow-up care  Follow up with your healthcare provider as advised. If you referred to a specialist, make that appointment promptly.  When to seek medical advice  Call your healthcare provider if any of the following occur:    Severe eye redness    Eye pain    Thick drainage from the eye    Change in vision (such as double vision or losing vision)    Fever over 100.4 F (38 C) or as directed by your healthcare provider    Headache, neck pain, weakness, trouble speaking or walking, or other unexplained symptoms  Date Last Reviewed: 3/1/2018    5468-9126 [employer]. [address] [place] [address]. All rights reserved. This information is not intended as a substitute for professional medical care. Always follow your healthcare professional's instructions.               No follow-ups on file.    The information in this document, created by the medical scribe, Jesus Manuel Aguillon, for me, accurately reflects the services I personally performed and the decisions made by me. I have reviewed and approved this document for accuracy prior to leaving the patient care area.    Toña Dodson,   [facility]

## 2019-10-03 NOTE — LETTER
[address] [id number] [phone]          November 20, 2019    Mariely Kathleen                                                                                                                     [address] [place] [address]            Dear Mariely,  Here are your LEEP instructions.   Schedule this for a time when you are not due to have a period (if having regular menstrual bleeding). You can take an over the counter pain reliever (either 600mg of Ibuprofen or 2 tablets Acetaminophen by mouth) 1 hour before your LEEP, if no allergies or contraindications to these medications. Nothing in the vagina for 24 hours before your LEEP (no sex, douches, vaginal medications or lubricants). No unprotected intercourse 2 weeks prior to this procedure. They will also check a urine pregnancy test prior to the LEEP. If you have any further questions please call Negin Estrada RN at [phone].       Sincerely,         Maegan Larkin MD./  Negin Turcios RN-Pap Tracking

## 2019-10-03 NOTE — PATIENT INSTRUCTIONS

## 2019-10-03 NOTE — PATIENT INSTRUCTIONS
I will prescribed gabapentin to help with the nerve pain. We will also run blood work to test you for lyme's disease.    Follow-up with your neurologist.   Patient Education     Dahl s Palsy    Bell's Palsy is a problem involving the nerve that controls the muscles on one side of the face.  In most cases, the cause is unknown, but may be related to inflammation of the nerve, diabetes, pregnancy, Lyme disease, and viral infections such as herpes or varicella. Symptoms usually appear only on one side. They may include:    Inability to close the eyelid    Tearing of the eye    Facial drooping    Drooling    Numbness or pain    Changes in taste    Sound sensitivity  Damage to the eye can be a serious problem. The inability to blink can cause the eye to dry out. An ulcer (sore) can then form on the cornea. Also, not blinking means that the eye has no protection from dirt and dust particles.  Treatment involves protecting and moistening the eye. Medicines, such as steroids, may also help.  Most people recover fully within 3 to 6 months. However, the condition sometimes returns months or years later.  Home care    Get plenty of rest and eat a healthy diet to help yourself recover.    Use artificial tears often during the day and at bedtime to prevent drying. These drops are available without prescription at your drug store.    Wear protective glasses especially when outside to protect from flying debris. Use sunglasses when outdoors.    Tape the eyelid closed at bedtime with a paper tape (available at your pharmacy). It has a very mild adhesive so won't injure the lid. This will protect your eye from injury while you sleep.    Sometimes medicines are prescribed to reduce inflammation or treat specific viral infections of the nerve. If medicines are prescribed, take them exactly as directed. Usually the sooner the medicines are started, the more effective they are. Taking this medicine as prescribed will help with a full  recovery.    Use low heat, for example from a heating pad, on the affected area. This can help reduce pain and swelling.    If you have severe pain, contact your healthcare provider.  Follow-up care  Follow up with your healthcare provider as advised. If you referred to a specialist, make that appointment promptly.  When to seek medical advice  Call your healthcare provider if any of the following occur:    Severe eye redness    Eye pain    Thick drainage from the eye    Change in vision (such as double vision or losing vision)    Fever over 100.4 F (38 C) or as directed by your healthcare provider    Headache, neck pain, weakness, trouble speaking or walking, or other unexplained symptoms  Date Last Reviewed: 3/1/2018    6676-7449 [employer]. [address] [place] [address], [place] [address]. All rights reserved. This information is not intended as a substitute for professional medical care. Always follow your healthcare professional's instructions.

## 2019-10-04 ENCOUNTER — TRANSFERRED RECORDS (OUTPATIENT)
Dept: HEALTH INFORMATION MANAGEMENT | Facility: CLINIC | Age: 54
End: 2019-10-04

## 2019-10-04 LAB — B BURGDOR IGG+IGM SER QL: 0.11 (ref 0–0.89)

## 2019-10-08 LAB
COPATH REPORT: ABNORMAL
COPATH REPORT: NORMAL
PAP: ABNORMAL

## 2019-10-09 PROBLEM — R87.613 HSIL ON PAP SMEAR OF CERVIX: Status: ACTIVE | Noted: 2019-10-03

## 2019-10-09 NOTE — PROGRESS NOTES
7/19/13 pap LSIL. Plan-- colposcopy within 3 months  8/28/13 colposcopy.  Pap- LSIL/neg HR HPV. BX: confirmed LSIL/cannot rule out HSIL.  Plan: cotest in 12 mo or cotest in 6 mo.  9/10/14 pap LSIL/+ HR HPV (31). Plan-- colp  10/14/14 colp/cancelled  11/14/14 reminder call today  12/10/14 patient lost to pap follow up.  See 9/22/14 my chart for details. HM @ 6 months for repeat pap.  2/24/15 pap LSIL/+ HR HPV. Plan: colposcopy  4/1/15 colposcopy. Limited exam. ECC WNL. Plan to see GYN  4/30/15 gyn consult scheduled. Cancelled.  6/25/15: [place] - KRIS I. Plan cotest pap & HPV in 1 year  05/11/17: [facility] pap, Endometrial cells present, LMP was 04/16/13 + HR HPV (not 16 or 18) result. Plan [place] and Endometrial Bx per provider.  07/13/17: [facility] Bx Normal, ECC and Endometrial Bx  Atypical squamous epithelial cells, cannot exclude high grade squamousintraepithelial lesion with benign endocervical cells. Plan pap and ECC in 6 months per provider, due 1/13/18.   06/20/18 Patient is lost to pap tracking follow-up.   11/7/18 ASC-H pap, + HR HPV (not 16 or 18). Plan: colp   6/23/19 Lost to follow-up for pap tracking  10/03/19: [place] Bx and ECC Focal squamous atypia Pap HSIL features suggestive of glandular involvement, + HR HPV (not 16 or 18) result. Plan LEEP per provider. Provider informed her of this on 11/12/19.  11/20/19: Pt didn't view [employer] message with LEEP instructions. Tc to mail saved letter with LEEP instructions.   12/18/19 LEEP- KRIS 2 with extension to the margins. Plan cotest and ECC in 6 months per provider. (MRA)  12/26/19 Provider left pt a vm and sent message in [employer] [facility] with result and recommendation. Pt read [employer] [facility]. (MRA)

## 2019-10-17 ENCOUNTER — TELEPHONE (OUTPATIENT)
Dept: OBGYN | Facility: CLINIC | Age: 54
End: 2019-10-17

## 2019-10-18 NOTE — TELEPHONE ENCOUNTER
Attempted to call patient to discuss recent colposcopy results and pap smear (discordant results).  Would like to discuss management options.   I can be paged tonight as I am on call.   If not, I will attempt to call her again.

## 2019-10-23 ENCOUNTER — TELEPHONE (OUTPATIENT)
Dept: OBGYN | Facility: CLINIC | Age: 54
End: 2019-10-23

## 2019-10-23 NOTE — TELEPHONE ENCOUNTER
Notes recorded by Negin Turcios RN on 10/23/2019 at 6:16 AM CDT  3rd request.   Hi Dr. Larkin,   Please advise so I can inform the pt.   The Pap has returned with HSIL features suggestive of glandular involvement,+ HR HPV (not 16 or 18) result. Okay to still recommend a cotest in 1 year or would you like a different plan? Please advise.   Thanks,  Negin Turcios RN-Pap Tracking  ------    Notes recorded by Negin Turcios RN on 10/16/2019 at 6:15 AM CDT  2nd request was sent to the provider today in the [facility] path.   Negin Turcios RN-Pap Tracking     ------    Notes recorded by Negin Turcios RN on 10/10/2019 at 8:22 AM CDT  Hi Dr. Larkin,   The Pap has returned with HSIL features suggestive of glandular involvement,+ HR HPV (not 16 or 18) result. Okay to still recommend a cotest in 1 year or would you like a different plan? Please advise.   Thanks,  Negin Turcios RN-Pap Tracking  ------    Notes recorded by Ethan An, RN on 10/8/2019 at 10:32 PM CDT  Awaiting HPV result.  MARK Merchant, RN -Pap Tracking Nurse

## 2019-10-23 NOTE — TELEPHONE ENCOUNTER
"Copied and pasted per result message of the provider,    \"I need to talk to patient and have been trying to reach her.\"    Negin Turcios RN-Pap Tracking     "

## 2019-11-01 ENCOUNTER — OFFICE VISIT (OUTPATIENT)
Dept: FAMILY MEDICINE | Facility: CLINIC | Age: 54
End: 2019-11-01
Payer: COMMERCIAL

## 2019-11-01 ENCOUNTER — TELEPHONE (OUTPATIENT)
Dept: FAMILY MEDICINE | Facility: CLINIC | Age: 54
End: 2019-11-01

## 2019-11-01 VITALS
DIASTOLIC BLOOD PRESSURE: 84 MMHG | SYSTOLIC BLOOD PRESSURE: 130 MMHG | TEMPERATURE: 97.9 F | HEART RATE: 88 BPM | WEIGHT: 160 LBS | BODY MASS INDEX: 28.35 KG/M2 | OXYGEN SATURATION: 98 % | HEIGHT: 63 IN

## 2019-11-01 DIAGNOSIS — J01.90 ACUTE SINUSITIS WITH SYMPTOMS > 10 DAYS: Primary | ICD-10-CM

## 2019-11-01 DIAGNOSIS — G51.0 BELL'S PALSY: ICD-10-CM

## 2019-11-01 DIAGNOSIS — J01.90 ACUTE SINUSITIS WITH SYMPTOMS > 10 DAYS: ICD-10-CM

## 2019-11-01 LAB
DEPRECATED S PYO AG THROAT QL EIA: NORMAL
SPECIMEN SOURCE: NORMAL

## 2019-11-01 PROCEDURE — 99213 OFFICE O/P EST LOW 20 MIN: CPT | Performed by: FAMILY MEDICINE

## 2019-11-01 PROCEDURE — 87081 CULTURE SCREEN ONLY: CPT | Performed by: FAMILY MEDICINE

## 2019-11-01 PROCEDURE — 87880 STREP A ASSAY W/OPTIC: CPT | Performed by: FAMILY MEDICINE

## 2019-11-01 RX ORDER — DOXYCYCLINE HYCLATE 100 MG
100 TABLET ORAL 2 TIMES DAILY
Qty: 20 TABLET | Refills: 0 | Status: SHIPPED | OUTPATIENT
Start: 2019-11-01 | End: 2020-04-24

## 2019-11-01 RX ORDER — PREDNISONE 20 MG/1
TABLET ORAL
Qty: 20 TABLET | Refills: 0 | Status: SHIPPED | OUTPATIENT
Start: 2019-11-01 | End: 2019-11-01

## 2019-11-01 RX ORDER — PREDNISONE 20 MG/1
TABLET ORAL
Qty: 20 TABLET | Refills: 0 | Status: SHIPPED | OUTPATIENT
Start: 2019-11-01

## 2019-11-01 ASSESSMENT — MIFFLIN-ST. JEOR: SCORE: 1299.89

## 2019-11-01 NOTE — PATIENT INSTRUCTIONS
Patient Education     Acute Bacterial Rhinosinusitis (ABRS)  Acute bacterial rhinosinusitis (ABRS) is an infection of your nasal cavity and sinuses. It s caused by bacteria. Acute means that you ve had symptoms for less than 4 weeks, but possibly up to 12 weeks.  Understanding your sinuses  The nasal cavity is the large air-filled space behind your nose. The sinuses are a group of spaces formed by the bones of your face. They connect with your nasal cavity. ABRS causes the tissue lining these spaces to become inflamed. Mucus may not drain normally. This leads to facial pain and other symptoms.  What causes ABRS?  ABRS most often follows an upper respiratory infection caused by a virus. Bacteria then infect the lining of your nasal cavity and sinuses. But you can also get ABRS if you have:    Nasal allergies    Long-term nasal swelling and congestion not caused by allergies    Blockage in the nose  Symptoms of ABRS  The symptoms of ABRS may be different for each person and include:    Nasal congestion or blockage    Pain or pressure in the face    Thick, colored drainage from the nose  Other symptoms may include:    Runny nose    Fluid draining from the nose down the throat (postnasal drip)    Headache    Cough    Pain    Fever  Diagnosing ABRS  ABRS may be diagnosed if you ve had an upper respiratory infection like a cold and cough for 10 or more days without improvement or with worsening symptoms. Your healthcare provider will ask about your symptoms and your medical history. The provider will check your vital signs, including your temperature. You ll have a physical exam. The healthcare provider will check your ears, nose, and throat. You likely won t need any tests. If ABRS comes back, you may have a culture or other tests.  Treatment for ABRS  Treatment may include:    Antibiotic medicine. This is for symptoms that last for at least 10 to 14 days.    Nasal corticosteroid medicine. Drops or spray used in the  nose can lessen swelling and congestion.    Over-the-counter pain medicine. This is to lessen sinus pain and pressure.    Nasal decongestant medicine. Spray or drops may help to lessen congestion. Do not use them for more than a few days.    Salt wash (saline irrigation). This can help to loosen mucus.  Possible complications of ABRS  ABRS may come back or become long-term (chronic). In rare cases, ABRS may cause complications such as:     Inflamed tissue around the brain and spinal cord (meningitis)    Inflamed tissue around the eyes (orbital cellulitis)    Inflamed bones around the sinuses (osteitis)  These problems may need to be treated in a hospital with intravenous (IV) antibiotic medicine or surgery.  When to call the healthcare provider  Call your healthcare provider if you have any of the following:    Symptoms that don t get better, or get worse    Symptoms that don t get better after 3 to 5 days on antibiotics    Trouble seeing    Swelling around your eyes    Confusion or trouble staying awake   Date Last Reviewed: 5/1/2017 2000-2018 [employer]. [address] [place] [address], [place] [address]. All rights reserved. This information is not intended as a substitute for professional medical care. Always follow your healthcare professional's instructions.

## 2019-11-01 NOTE — TELEPHONE ENCOUNTER
Resent prescription for Prednisone to the pharmacy.  Patient states that pharmacy did not receive it.  Naty Reyes RN

## 2019-11-01 NOTE — TELEPHONE ENCOUNTER
Reason for Call:  Other     Detailed comments:  Patient saw Dr Harden this morning and she had sent two Rx's to pharmacy.  Pharmacy is stating they have the doxycycline hyclate (VIBRA-TABS) 100 MG tablet but they dont have the Prednisone.  Please resend to pharmacy.    Phone Number Patient can be reached at: Home number on file [phone] (home)    Best Time: any    Can we leave a detailed message on this number? YES    Call taken on 11/1/2019 at 11:19 AM by Luz Maria Delgadillo

## 2019-11-01 NOTE — PROGRESS NOTES
Subjective     Mariely Kathleen is a 53 year old female who presents to clinic today for the following health issues:    HPI     ENT Symptoms             Symptoms: cc Present Absent Comment   Fever/Chills  x  Off and on   Fatigue  x     Muscle Aches  x     Eye Irritation   x    Sneezing   x    Nasal Ajay/Drg  x     Sinus Pressure/Pain  x     Loss of smell   x    Dental pain   x    Sore Throat x      Swollen Glands  x     Ear Pain/Fullness  x  More left side   Cough  x  More dry   Wheeze   x    Chest Pain   x    Shortness of breath  x     Rash   x    Other         Symptom duration:  1 week   Symptom severity:  moderate to severe   Treatments tried:  Albuterol   Contacts:  work     Patient was diagnosed with [place] palsy on 9/23 and treated with steroids.  Has been off steroids for 2-3 weeks now.        Patient Active Problem List   Diagnosis     CARDIOVASCULAR SCREENING; LDL GOAL LESS THAN 160     HSIL on Pap smear of cervix     Family history of premature CAD     Genital herpes     Acute appendicitis     Past Surgical History:   Procedure Laterality Date     COLONOSCOPY WITH CO2 INSUFFLATION N/A 7/18/2017    Procedure: COLONOSCOPY WITH CO2 INSUFFLATION;  COLON SCREEN/ RIVERS;  Surgeon: Jeremy Moran MD;  Location: [facility] OR     LAPAROSCOPIC APPENDECTOMY N/A 3/14/2018    Procedure: LAPAROSCOPIC APPENDECTOMY;  laparoscopic appendectomy;  Surgeon: Agueda Torres MD;  Location: [facility] OR     ORTHOPEDIC SURGERY      Bunion surgery left foot       Social History     Tobacco Use     Smoking status: Never Smoker     Smokeless tobacco: Never Used   Substance Use Topics     Alcohol use: Yes     Comment: On occasion     Family History   Problem Relation Age of Onset     Hypertension Mother      Hyperlipidemia Mother      C.A.D. Father 58        MI, stents     Hypertension Father      Coronary Artery Disease Father      C.A.D. Brother 50        MI     Hypertension Brother      Diabetes No family hx of      Breast  "Cancer No family hx of      Cancer - colorectal No family hx of            Reviewed and updated as needed this visit by Provider         Review of Systems   ROS COMP: Constitutional, HEENT, cardiovascular, pulmonary, gi and gu systems are negative, except as otherwise noted.      Objective    /84 (BP Location: Right arm, Patient Position: Sitting, Cuff Size: Adult Regular)   Pulse 88   Temp 97.9  F (36.6  C) (Oral)   Ht 1.6 m (5' 3\")   Wt 72.6 kg (160 lb)   LMP 04/16/2013   SpO2 98%   BMI 28.34 kg/m    Body mass index is 28.34 kg/m .  Physical Exam   GENERAL: healthy, alert and no distress  HENT: normal cephalic/atraumatic, ear canals and TM's normal, nose and mouth without ulcers or lesions, oropharynx clear, oral mucous membranes moist and sinuses: maxillary, frontal tenderness on bilateral  NECK: bilateral anterior cervical adenopathy  RESP: lungs clear to auscultation - no rales, rhonchi or wheezes  CV: regular rates and rhythm, normal S1 S2, no S3 or S4 and no murmur, click or rub  NEURO: slight left facial droop (known bells palsy)    Diagnostic Test Results:  Results for orders placed or performed in visit on 11/01/19 (from the past 24 hour(s))   Rapid strep screen   Result Value Ref Range    Specimen Description Throat     Rapid Strep A Screen       NEGATIVE: No Group A streptococcal antigen detected by immunoassay, await culture report.           Assessment & Plan     1. Acute sinusitis with symptoms > 10 days  - Rapid strep screen  - Beta strep group A culture  - doxycycline hyclate (VIBRA-TABS) 100 MG tablet; Take 1 tablet (100 mg) by mouth 2 times daily for 10 days  Dispense: 20 tablet; Refill: 0  - predniSONE (DELTASONE) 20 MG tablet; Take 3 tabs by mouth daily x 3 days, then 2 tabs daily x 3 days, then 1 tab daily x 3 days, then 1/2 tab daily x 3 days.  Dispense: 20 tablet; Refill: 0    2. Bell's palsy  - Improving  - Followed by neurology       Return if symptoms worsen or fail to " improve.    Lindy Harden,   [facility]

## 2019-11-02 LAB
BACTERIA SPEC CULT: NORMAL
SPECIMEN SOURCE: NORMAL

## 2019-11-06 NOTE — TELEPHONE ENCOUNTER
Bernard Larkin,   Just inquiring if you were able to contact this pt? Please advise.   Thank you,  Negin Turcios RN-Pap Tracking

## 2019-11-13 NOTE — RESULT ENCOUNTER NOTE
Talked to to Mariely,  Discussed mismatch between pap and colposcopy.   Discussed pap/colposcopy in 3-6 months versus LEEP procedure. Given duration of changes and progression toward more high grade changes on pap, Mariely would like to proceed with LEEP.   Forwarding to my triage pool. Please call Mariely to help her schedule LEEP. Let me know if I need to look for options to fit her in. Ok to leave message, she will often call back shortly after message is left.

## 2019-11-13 NOTE — TELEPHONE ENCOUNTER
"Copied and pasted per result note of the provider,  \"Talked to to Mariely,   Discussed mismatch between pap and colposcopy.   Discussed pap/colposcopy in 3-6 months versus LEEP procedure. Given duration of changes and progression toward more high grade changes on pap, Mariely would like to proceed with LEEP.   Forwarding to my triage pool. Please call Mariely to help her schedule LEEP. Let me know if I need to look for options to fit her in. Ok to leave message, she will often call back shortly after message is left.\"    Negin Turcios, RN-Pap Tracking     "

## 2019-12-18 ENCOUNTER — OFFICE VISIT (OUTPATIENT)
Dept: OBGYN | Facility: CLINIC | Age: 54
End: 2019-12-18
Payer: COMMERCIAL

## 2019-12-18 VITALS
BODY MASS INDEX: 29.41 KG/M2 | SYSTOLIC BLOOD PRESSURE: 129 MMHG | WEIGHT: 166 LBS | HEART RATE: 99 BPM | OXYGEN SATURATION: 100 % | DIASTOLIC BLOOD PRESSURE: 87 MMHG | HEIGHT: 63 IN

## 2019-12-18 DIAGNOSIS — R87.613 HSIL ON PAP SMEAR OF CERVIX: Primary | ICD-10-CM

## 2019-12-18 PROCEDURE — 88341 IMHCHEM/IMCYTCHM EA ADD ANTB: CPT | Performed by: OBSTETRICS & GYNECOLOGY

## 2019-12-18 PROCEDURE — 88307 TISSUE EXAM BY PATHOLOGIST: CPT | Performed by: OBSTETRICS & GYNECOLOGY

## 2019-12-18 PROCEDURE — 88342 IMHCHEM/IMCYTCHM 1ST ANTB: CPT | Performed by: OBSTETRICS & GYNECOLOGY

## 2019-12-18 PROCEDURE — 57461 CONZ OF CERVIX W/SCOPE LEEP: CPT | Performed by: OBSTETRICS & GYNECOLOGY

## 2019-12-18 ASSESSMENT — MIFFLIN-ST. JEOR: SCORE: 1322.1

## 2019-12-18 NOTE — PATIENT INSTRUCTIONS
LEEP (LOOP EXCISION ELECTROCAUTERY PROCEDURE)    The LEEP procedure is done using a local anesthetic to numb the cervix. While visualizing the area with a colposcopy, a small thin wire loopexcises the abnormal tissue from the cervix. Cautery is used to control any bleeding.    POST-LEEP INSTRUCTIONS    Limit your activity to 1-2 days following the LEEP procedure. Avoid strenuous exercises for one week.    To prevent infection or trauma to the surgical site, you should not put anything into the vagina for three weeks. This means no tampons, no intercourse and no douching.    You may have  brown yellowish pink or red discharge for a few days after the   procedure. As the cervix heals, your vaginal discharge may be clear and watery for a few weeks then gradually tapering off.    You may also experience some bleeding immediately after the LEEP procedure and/or again 7-10 days later as the cervical area heals. The bleeding should not be heavier than a normal menses.    Do not be concerned if your next menses is delayed and/or the flow is heavier than normal. This can also be due to the LEEP procedure.    CALL for any of the followingconcerns:    Fever over 101 degrees F.  Pain not controlled by over the counter analgesics such as Tylenol or Ibuprofen.  Foul-smelling discharge.  Bleeding: more than one pad an hour for two hours.  Any questions regarding the LEEP procedure.    [facility] - OB/GYN : [phone]

## 2019-12-18 NOTE — PROGRESS NOTES
Mariely Kathleen is a 54 year old female  who presents for LEEP.   Pap on 10/03/2019 showed HGSIL (A) pap with other HR HPV +(A) present.  Colposcopy on 10/3/19 showed HGSIL pap, squamous atypia with p16 and Ki67.   Patient's last menstrual period was 2013..   UPT today is not needed postmenopausal.     PROCEDURE NOTE:   With the patient's consent, a LEEP was performed. The cervix and vagina were swabbed with dilute acetic acid and a brief colposcopic exam was performed. Next the cervix and vagina were cleansed with lugol's solution. An intracervical block was then placed with 1% lidocaine with epinephrine, approximately 7 ml were used. A LEEP cervical biopsy was then performed in one pass. The bed of the excised area was then made hemostatic with roller-ball cautery. Estimated blood loss was minimal. The specimen was submitted to pathology for examination. The patient tolerated the procedure well and was discharged home in stable condition. There were no complications.     PLAN:   Specimens labeled and sent to Pathology. Routine LEEP post-op instructions were given including no douching, tampons or intercourse for 2-4 weeks and to call if any heavy bleeding or abnormal discharge.   Will let patient know pathology results, but will probably plan for repeat pap smear +/- HPV testing in 6-12 months as indicated by pathology results.  Maegan Larkin MD

## 2019-12-24 LAB — COPATH REPORT: NORMAL

## 2020-03-02 ENCOUNTER — HEALTH MAINTENANCE LETTER (OUTPATIENT)
Age: 55
End: 2020-03-02

## 2020-04-10 ENCOUNTER — TRANSFERRED RECORDS (OUTPATIENT)
Dept: HEALTH INFORMATION MANAGEMENT | Facility: CLINIC | Age: 55
End: 2020-04-10

## 2020-04-23 ENCOUNTER — MYC MEDICAL ADVICE (OUTPATIENT)
Dept: OBGYN | Facility: CLINIC | Age: 55
End: 2020-04-23

## 2020-04-24 ENCOUNTER — OFFICE VISIT (OUTPATIENT)
Dept: OBGYN | Facility: CLINIC | Age: 55
End: 2020-04-24
Payer: COMMERCIAL

## 2020-04-24 VITALS
SYSTOLIC BLOOD PRESSURE: 128 MMHG | WEIGHT: 162 LBS | TEMPERATURE: 98.9 F | HEIGHT: 63 IN | BODY MASS INDEX: 28.7 KG/M2 | DIASTOLIC BLOOD PRESSURE: 88 MMHG

## 2020-04-24 DIAGNOSIS — N93.9 ABNORMAL UTERINE BLEEDING (AUB): Primary | ICD-10-CM

## 2020-04-24 LAB
HCG UR QL: NEGATIVE
SPECIMEN SOURCE: NORMAL
WET PREP SPEC: NORMAL

## 2020-04-24 PROCEDURE — 81025 URINE PREGNANCY TEST: CPT | Performed by: OBSTETRICS & GYNECOLOGY

## 2020-04-24 PROCEDURE — 99214 OFFICE O/P EST MOD 30 MIN: CPT | Mod: 25 | Performed by: OBSTETRICS & GYNECOLOGY

## 2020-04-24 PROCEDURE — 87491 CHLMYD TRACH DNA AMP PROBE: CPT | Performed by: OBSTETRICS & GYNECOLOGY

## 2020-04-24 PROCEDURE — 88305 TISSUE EXAM BY PATHOLOGIST: CPT | Performed by: OBSTETRICS & GYNECOLOGY

## 2020-04-24 PROCEDURE — 87591 N.GONORRHOEAE DNA AMP PROB: CPT | Performed by: OBSTETRICS & GYNECOLOGY

## 2020-04-24 PROCEDURE — 58100 BIOPSY OF UTERUS LINING: CPT | Performed by: OBSTETRICS & GYNECOLOGY

## 2020-04-24 PROCEDURE — 87210 SMEAR WET MOUNT SALINE/INK: CPT | Performed by: OBSTETRICS & GYNECOLOGY

## 2020-04-24 SDOH — HEALTH STABILITY: MENTAL HEALTH: HOW OFTEN DO YOU HAVE 6 OR MORE DRINKS ON ONE OCCASION?: NEVER

## 2020-04-24 SDOH — HEALTH STABILITY: MENTAL HEALTH: HOW MANY STANDARD DRINKS CONTAINING ALCOHOL DO YOU HAVE ON A TYPICAL DAY?: 1 OR 2

## 2020-04-24 SDOH — HEALTH STABILITY: MENTAL HEALTH: HOW OFTEN DO YOU HAVE A DRINK CONTAINING ALCOHOL?: MONTHLY OR LESS

## 2020-04-24 ASSESSMENT — MIFFLIN-ST. JEOR: SCORE: 1303.96

## 2020-04-24 NOTE — TELEPHONE ENCOUNTER
Since she is postmenopausal, should not have continued bleeding. Agree should be healed by now but sounds like ?granulation tissue.    Will need in person visit. Today vs Monday--not sure what schedule looks like right now    Светлана Hamm MD

## 2020-04-24 NOTE — TELEPHONE ENCOUNTER
Patient called regarding [employer] message. She hadn't read my questions so I asked them over the phone. Bleeding is heavy and doesn't really stop x 2 days. Using tissue instead of tampon or pad as she doesn't have any. Looked like there were some tiny clots yesterday. Has had heavy bloating feeling in pelvic area and stomach x 2 weeks. Pt had LEEP on 12/18/19 and had some heavy bleeding following the LEEP. I reassured her that she is healed from that procedure by now. Pt asked if she could have an infection. Concerned about the bleeding and bloating. Routing to provider. Please advise. Thanks.   Chandrika Stauffer, RN-BSN

## 2020-04-24 NOTE — PATIENT INSTRUCTIONS
Endometrial Biopsy  Post-Procedure Patient Instructions      Please monitor for any of the following:      Fever   Cramping after 48 hours   Bleeding for 24-48 hours that is heavier than a normal period   Any bleeding that soaks more than 1 pad per hour      Please call your health care provider if you develop any of the above symptoms or problems.     [facility]   Nurse Triage Line [phone]      Use pads, not tampons, for the bleeding. You may resume sexual relations in 2-3 days after bleeding has stopped.

## 2020-04-24 NOTE — TELEPHONE ENCOUNTER
Routing to on-call provider. Are you able to advise when you have a chance? Pt asking.   Chandrika Stauffer, RN-BSN

## 2020-04-25 NOTE — PROGRESS NOTES
"[facility]    CC: vaginal bleeding    S:Mariely Kathleen is a 54 year old postmenopausal patient with complaints of vaginal bleeding.  She reports that for the past 2 weeks she has been experiencing abdominal bloating.  Then 2 days ago she began to have vaginal bleeding similar to a menses.  She also has cramping and some nausea.  She denies any diarrhea, constipation, or vomiting.      12/18/19 patient had a LEEP performed.  She did have some increased bleeding in Jan, but it was thought to be due to normal post-LEEP bleeding.      Patient reports menopause approximately 10 years ago.     O: /88   Temp 98.9  F (37.2  C) (Oral)   Ht 1.6 m (5' 3\")   Wt 73.5 kg (162 lb)   LMP 04/16/2013   BMI 28.70 kg/m      Exam:  GENERAL: healthy, alert and no distress  CV: regular rate   : normal appearing external genitalia, normal appearing vaginal mucosa, normal appearing cervix, moderate amount of dark red blood in vaginal vault, blood mixed with cervical mucous coming from the cervical os  MS: no gross musculoskeletal defects noted, no edema    A&P: Mariely Kathleen is a 54 year old postmenopausal patient with complaints of vaginal bleeding.    (N93.9) Abnormal uterine bleeding (AUB)  (primary encounter diagnosis)  Comment: Reviewed potential causes of PMB including cervical etiology vs. Structural vs. Hyperplasia/cancer.  Explained that given her symptoms endometrial biopsy and pelvic ultrasound recommended.  See below procedure note.   Plan: Wet prep, NEISSERIA GONORRHOEA PCR, CHLAMYDIA         TRACHOMATIS PCR, US Transvaginal Non OB,         Surgical pathology exam    PROCEDURE:  Endometrial biopsy    Reviewed indications, alternatives, benefits, and risks including bleeding, infection, cramping pain.  Questions and concerns were addressed.  Consent was signed.     Patient in dorsal lithotomy position.  Medium marc speculum placed with adequate visualization of the cervix.  Cervix cleansed " with betadine swabs x3.  Cervix grasped with single tooth tenaculum.  Cervical os patency confirmed with blue os finder.  Pipelle used with moderate amount of tissue return.  Specimen placed in container.  Speculum removed.  Patient tolerated procedure well.      Radha Gifford MD

## 2020-04-26 LAB
C TRACH DNA SPEC QL NAA+PROBE: NEGATIVE
N GONORRHOEA DNA SPEC QL NAA+PROBE: NEGATIVE
SPECIMEN SOURCE: NORMAL
SPECIMEN SOURCE: NORMAL

## 2020-04-28 LAB — COPATH REPORT: NORMAL

## 2020-12-20 ENCOUNTER — HEALTH MAINTENANCE LETTER (OUTPATIENT)
Age: 55
End: 2020-12-20

## 2021-01-15 ENCOUNTER — HEALTH MAINTENANCE LETTER (OUTPATIENT)
Age: 56
End: 2021-01-15

## 2021-03-23 ENCOUNTER — PATIENT OUTREACH (OUTPATIENT)
Dept: OBGYN | Facility: CLINIC | Age: 56
End: 2021-03-23

## 2021-03-23 PROBLEM — N87.1 CIN II (CERVICAL INTRAEPITHELIAL NEOPLASIA II): Status: ACTIVE | Noted: 2019-10-03

## 2021-03-23 NOTE — LETTER
March 23, 2021      Mariely Kathleen  [address]  [place] [address]        Dear ,    This letter is to remind you that you are due for your follow-up pap smear, HPV test, and endocervical curretage.    Please call [phone] to schedule your appointment at your earliest convenience.    If you have completed the appointment outside of the [facility] system, please have the records forwarded to our office. We will update your chart for your provider to review before your next annual wellness visit.     Thank you for choosing [facility]!      Sincerely,    Your [facility] Care Team

## 2021-04-18 ENCOUNTER — HEALTH MAINTENANCE LETTER (OUTPATIENT)
Age: 56
End: 2021-04-18

## 2021-06-11 ENCOUNTER — TELEPHONE (OUTPATIENT)
Dept: FAMILY MEDICINE | Facility: CLINIC | Age: 56
End: 2021-06-11

## 2021-06-11 NOTE — TELEPHONE ENCOUNTER
Patient is calling because she is relocating to [place] and going to establish care there.  No longer in [place].     Belinda Boyer/  [place]

## 2021-10-03 ENCOUNTER — HEALTH MAINTENANCE LETTER (OUTPATIENT)
Age: 56
End: 2021-10-03

## 2022-01-23 ENCOUNTER — HEALTH MAINTENANCE LETTER (OUTPATIENT)
Age: 57
End: 2022-01-23

## 2022-05-15 ENCOUNTER — HEALTH MAINTENANCE LETTER (OUTPATIENT)
Age: 57
End: 2022-05-15

## 2022-09-04 ENCOUNTER — HEALTH MAINTENANCE LETTER (OUTPATIENT)
Age: 57
End: 2022-09-04

## 2022-09-12 ENCOUNTER — APPOINTMENT (RX ONLY)
Dept: URBAN - METROPOLITAN AREA CLINIC 128 | Facility: CLINIC | Age: 57
Setting detail: DERMATOLOGY
End: 2022-09-12

## 2022-09-12 DIAGNOSIS — D22 MELANOCYTIC NEVI: ICD-10-CM

## 2022-09-12 DIAGNOSIS — Z71.89 OTHER SPECIFIED COUNSELING: ICD-10-CM

## 2022-09-12 DIAGNOSIS — L82.1 OTHER SEBORRHEIC KERATOSIS: ICD-10-CM

## 2022-09-12 DIAGNOSIS — L82.0 INFLAMED SEBORRHEIC KERATOSIS: ICD-10-CM

## 2022-09-12 DIAGNOSIS — L91.0 HYPERTROPHIC SCAR: ICD-10-CM

## 2022-09-12 PROBLEM — D22.5 MELANOCYTIC NEVI OF TRUNK: Status: ACTIVE | Noted: 2022-09-12

## 2022-09-12 PROCEDURE — ? DIAGNOSIS COMMENT

## 2022-09-12 PROCEDURE — ? BENIGN DESTRUCTION

## 2022-09-12 PROCEDURE — ? COUNSELING

## 2022-09-12 PROCEDURE — ? SUNSCREEN RECOMMENDATIONS

## 2022-09-12 PROCEDURE — 17110 DESTRUCTION B9 LES UP TO 14: CPT

## 2022-09-12 PROCEDURE — 99203 OFFICE O/P NEW LOW 30 MIN: CPT | Mod: 25

## 2022-09-12 ASSESSMENT — LOCATION DETAILED DESCRIPTION DERM
LOCATION DETAILED: RIGHT MEDIAL SUPERIOR CHEST
LOCATION DETAILED: RIGHT SUPERIOR MEDIAL UPPER BACK
LOCATION DETAILED: SUPERIOR THORACIC SPINE
LOCATION DETAILED: LEFT LATERAL MALAR CHEEK
LOCATION DETAILED: LOWER STERNUM
LOCATION DETAILED: MIDDLE STERNUM

## 2022-09-12 ASSESSMENT — LOCATION SIMPLE DESCRIPTION DERM
LOCATION SIMPLE: RIGHT UPPER BACK
LOCATION SIMPLE: UPPER BACK
LOCATION SIMPLE: LEFT CHEEK
LOCATION SIMPLE: CHEST

## 2022-09-12 ASSESSMENT — LOCATION ZONE DERM
LOCATION ZONE: TRUNK
LOCATION ZONE: FACE

## 2022-09-12 NOTE — PROCEDURE: BENIGN DESTRUCTION
Add 52 Modifier (Optional): no
Consent: The patient's consent was obtained including but not limited to risks of crusting, scabbing, blistering, scarring, darker or lighter pigmentary change, recurrence, incomplete removal and infection.
Medical Necessity Clause: This procedure was medically necessary because the lesions that were treated were:
Anesthesia Volume In Cc: 0
Medical Necessity Information: It is in your best interest to select a reason for this procedure from the list below. All of these items fulfill various CMS LCD requirements except the new and changing color options.
Detail Level: Detailed
Post-Care Instructions: I reviewed with the patient in detail post-care instructions. Patient is to wear sunprotection, and avoid picking at any of the treated lesions. Pt may apply Vaseline to crusted or scabbing areas.
Render Post-Care Instructions In Note?: yes

## 2022-09-12 NOTE — PROCEDURE: MIPS QUALITY
Detail Level: Generalized
Additional Notes: Patient does not recall medication names and dosages. Will bring updated list to next visit.
Quality 130: Documentation Of Current Medications In The Medical Record: Documentation of a medical reason(s) for not documenting, updating, or reviewing the patientâs current medications list (e.g., patient is in an urgent or emergent medical situation)

## 2022-09-12 NOTE — PROCEDURE: DIAGNOSIS COMMENT
Comment: Discussed treatment with ILK, will treat if needed
Render Risk Assessment In Note?: no
Detail Level: Generalized

## 2023-04-30 ENCOUNTER — HEALTH MAINTENANCE LETTER (OUTPATIENT)
Age: 58
End: 2023-04-30

## 2023-06-03 ENCOUNTER — HEALTH MAINTENANCE LETTER (OUTPATIENT)
Age: 58
End: 2023-06-03

## 2025-06-27 NOTE — ED PROVIDER NOTES
History     Chief Complaint   Patient presents with     Abdominal Pain     HPI  Mariely Mark is a 52 year old female who otherwise healthy presents the ED from clinic with RLQ abdominal pain.  Per review of her chart, she was just seen in clinic earlier today for her RLQ abdominal pain and had an abdominal x-ray which showed multiple tiny hypodensities in the colon likely representing ingested material.  She also had UA and CBC which were largely unremarkable. Patient states her abdominal pain started last night and lasted for about 4 hours and was originally diffuse in location.  She stated that nothing improved the pain including changing positions.  When she woke up this morning,  the pain migrated to her RLQ and did not feel well, so she saw her PCP this morning.  Her PCP did schedule an abdominal CT on Friday, as patient has IV contrast dye allergy but wanted her to come in sooner.Therefore, she was sent to the ED.  Patient does have complaints of slight nausea and has not eaten since 6:30 PM last night.  She states that she only takes progesterone and estrogen denies any other medications taken regularly at this time.  She currently denies any fevers or previous abdominal surgeries.     PAST MEDICAL HISTORY  Past Medical History:   Diagnosis Date     ASCUS with positive high risk HPV cervical 05/11/2017 05/11/17: Ascus pap, Endometrial cells present, + HR HPV (not 16 or 18) result.     H/O colposcopy with cervical biopsy 08/28/2013, 07/13/17    LSIL, 07/13/17: [facility] Bx Normal, ECC and Endometrial Bx  Atypical squamous epithelial cells, cannot exclude high grade squamousintraepithelial lesion with benign endocervical cells.      LSIL (low grade squamous intraepithelial lesion) on Pap smear 7/19/13     LSIL (low grade squamous intraepithelial lesion) on Pap smear 9/10/14    + HR HPV     LSIL (low grade squamous intraepithelial lesion) on Pap smear 2/24/15    + HR HPV     S/P bunionectomy 2006    L foot  Show Spray Paint Technique Variable?: Yes "    PAST SURGICAL HISTORY  Past Surgical History:   Procedure Laterality Date     COLONOSCOPY WITH CO2 INSUFFLATION N/A 7/18/2017    Procedure: COLONOSCOPY WITH CO2 INSUFFLATION;  COLON SCREEN/ RIVERS;  Surgeon: Jeremy Moran MD;  Location: [facility] OR     FAMILY HISTORY  Family History   Problem Relation Age of Onset     Hypertension Mother      C.A.D. Father 58     MI, stents     Hypertension Father      C.A.D. Brother 50     MI     DIABETES No family hx of      Breast Cancer No family hx of      Cancer - colorectal No family hx of      SOCIAL HISTORY  Social History   Substance Use Topics     Smoking status: Never Smoker     Smokeless tobacco: Never Used     Alcohol use Yes      Comment: 1 time per week     MEDICATIONS  Current Facility-Administered Medications   Medication     0.9% sodium chloride BOLUS    Followed by     sodium chloride 0.9% infusion     ondansetron (ZOFRAN) injection 4 mg     ketorolac (TORADOL) injection 30 mg     iohexol (OMNIPAQUE) solution 50 mL     Current Outpatient Prescriptions   Medication     estradiol (VIVELLE-DOT) 0.05 MG/24HR BIW patch     PROGESTERONE MICRONIZED PO     valACYclovir (VALTREX) 500 MG tablet     ALLERGIES  Allergies   Allergen Reactions     Dye [Contrast Dye] Hives     Penicillins Hives       I have reviewed the Medications, Allergies, Past Medical and Surgical History, and Social History in the Epic system.    Review of Systems   Constitutional: Negative for fever.   Gastrointestinal: Positive for abdominal pain (RLQ) and nausea.   All other systems reviewed and are negative.      Physical Exam   BP: 141/63  Pulse: 73  Temp: 97.5  F (36.4  C)  Resp: 16  Height: 160 cm (5' 3\")  Weight: 66.3 kg (146 lb 1.6 oz)  SpO2: 100 %      Physical Exam   Constitutional: She is oriented to person, place, and time. She appears well-developed and well-nourished.   HENT:   Head: Normocephalic and atraumatic.   Neck: Normal range of motion.   Cardiovascular: Normal rate, regular " Duration Of Freeze Thaw-Cycle (Seconds): 5-10 Detail Level: Detailed rhythm and normal heart sounds.    Pulmonary/Chest: Effort normal and breath sounds normal. No respiratory distress.   Abdominal: Soft. She exhibits no distension. There is tenderness (tender RLQ; ). There is no rebound.   Musculoskeletal: She exhibits no tenderness.   Neurological: She is alert and oriented to person, place, and time.   Skin: Skin is warm and dry.   Tan skin   Psychiatric: She has a normal mood and affect. Her behavior is normal. Thought content normal.       ED Course     ED Course     Procedures   3:39 PM  The patient was seen and examined by Dr. Castro in Room 14.                Critical Care time:  none         Results for orders placed or performed during the hospital encounter of 03/14/18   CT Abdomen Pelvis w/o Contrast    Narrative    EXAMINATION: CT ABDOMEN PELVIS W/O CONTRAST, 3/14/2018 7:34 PM    TECHNIQUE:  Helical CT images from the lung bases through the  symphysis pubis were obtained without IV contrast.    COMPARISON: Radiograph 3/14/2018    HISTORY: RLQ abd pain; evaluate for appy;     FINDINGS:  LIVER: Within normal limits.    BILIARY: Within normal limits.    PANCREAS: Within normal limits.    SPLEEN: Within normal limits.    ADRENAL GLANDS: Within normal limits.    GENITOURINARY TRACT: Within normal limits.    GASTROINTESTINAL TRACT: Normal caliber of the small and large bowel.  Appendix is dilated to 9 mm in diameter, with a mild amount of  periappendiceal stranding in the right lower quadrant. No fluid  collection or free air to suggest perforation.    PERITONEUM/FLUID: No free fluid.    VESSELS: Normal caliber of the abdominal aorta.  The portal, splenic,  and superior mesenteric veins are patent.     LYMPH NODES: Scattered mildly prominent mesenteric lymph nodes which  are not enlarged, for example two 6 mm mid abdominal nodes (series 2,  image 78). No enlarged lymph nodes in the abdomen or pelvis.    LUNG BASES: Mild dependent atelectasis.    BONES/SOFT TISSUES: Multilevel  Render Note In Bullet Format When Appropriate: No degenerative changes of the spine.      Impression    IMPRESSION: Dilatation of the appendix at 9 mm in diameter with a mild  amount of periappendiceal stranding in the right lower quadrant,  suggesting very early uncomplicated appendicitis.     Findings discussed with Dr. Castro at 7:46 PM 3/14/2018.    I have personally reviewed the examination and initial interpretation  and I agree with the findings.    JENNIFER SWAN MD   Comprehensive metabolic panel   Result Value Ref Range    Sodium 138 133 - 144 mmol/L    Potassium 3.7 3.4 - 5.3 mmol/L    Chloride 103 94 - 109 mmol/L    Carbon Dioxide 25 20 - 32 mmol/L    Anion Gap 10 3 - 14 mmol/L    Glucose 86 70 - 99 mg/dL    Urea Nitrogen 18 7 - 30 mg/dL    Creatinine 0.82 0.52 - 1.04 mg/dL    GFR Estimate 73 >60 mL/min/1.7m2    GFR Estimate If Black 88 >60 mL/min/1.7m2    Calcium 9.1 8.5 - 10.1 mg/dL    Bilirubin Total 0.8 0.2 - 1.3 mg/dL    Albumin 4.1 3.4 - 5.0 g/dL    Protein Total 8.0 6.8 - 8.8 g/dL    Alkaline Phosphatase 86 40 - 150 U/L    ALT 23 0 - 50 U/L    AST 19 0 - 45 U/L   Lipase   Result Value Ref Range    Lipase 346 73 - 393 U/L     Medications   0.9% sodium chloride BOLUS (0 mLs Intravenous Stopped 3/14/18 1706)     Followed by   sodium chloride 0.9% infusion (not administered)   ondansetron (ZOFRAN) injection 4 mg ( Intravenous Auto Hold 3/14/18 2137)   ciprofloxacin (CIPRO) infusion 400 mg ( Intravenous Auto Hold 3/14/18 2137)   metroNIDAZOLE (FLAGYL) infusion 500 mg ( Intravenous Auto Hold 3/14/18 2137)   Provider ordered ALTERNATE pre op antibiotic. (not administered)   lactated ringers infusion (not administered)   fentaNYL (PF) (SUBLIMAZE) injection 25-50 mcg (not administered)   ondansetron (ZOFRAN-ODT) ODT tab 4 mg (not administered)     Or   ondansetron (ZOFRAN) injection 4 mg (not administered)   prochlorperazine (COMPAZINE) injection 10 mg (not administered)   sodium citrate-citric acid (BICITRA) solution 30 mL (not administered)    labetalol (NORMODYNE/TRANDATE) injection 10 mg (not administered)   ketorolac (TORADOL) injection 30 mg (not administered)   meperidine (DEMEROL) injection 12.5 mg (not administered)   dimenhyDRINATE (DRAMAMINE) injection 25 mg (not administered)   lidocaine 1 % 1 mL (not administered)   lidocaine (LMX4) kit (not administered)   sodium chloride (PF) 0.9% PF flush 3 mL (not administered)   sodium chloride (PF) 0.9% PF flush 3 mL (not administered)   lactated ringers infusion (not administered)   ketorolac (TORADOL) injection 30 mg (30 mg Intravenous Given 3/14/18 2186)   iohexol (OMNIPAQUE) solution 50 mL (50 mLs Oral Given 3/14/18 1704)            Labs Ordered and Resulted from Time of ED Arrival Up to the Time of Departure from the ED - No data to display         Assessments & Plan (with Medical Decision Making)   The patient is a 52-year-old female who was sent to the ER from PCP due to concern for appendicitis. On initial exam the patient did have right lower quadrant tenderness. The patient had CT abdomen/pelvis that showed sign of early uncomplicated appendicitis. This was discussed with the surgery resident. Final disposition will be per the surgery team. I did discuss the diagnosis with the patient and her friend.    Pt transferred to OR per surgery recs.         This part of the document was transcribed by Serg Knight for Oneyda Castro MD.     I have reviewed the nursing notes.    I have reviewed the findings, diagnosis, plan and need for follow up with the patient.    New Prescriptions    No medications on file       Final diagnoses:   Acute appendicitis with localized peritonitis     Parish ALFARO, am serving as a trained medical scribe to document services personally performed by Oneyda Castro MD, based on the provider's statements to me.      Oneyda ALFARO MD, was physically present and have reviewed and verified the accuracy of this note documented by Parish Parker.     3/14/2018   [facility], [place],  Medical Necessity Clause: This procedure was medically necessary because the lesions that were treated were: EMERGENCY DEPARTMENT     Oneyda Castro MD  03/14/18 1452     Number Of Freeze-Thaw Cycles: 2 freeze-thaw cycles Consent: The patient's consent was obtained including but not limited to risks of crusting, scabbing, blistering, scarring, darker or lighter pigmentary change, recurrence, incomplete removal and infection. Medical Necessity Information: It is in your best interest to select a reason for this procedure from the list below. All of these items fulfill various CMS LCD requirements except the new and changing color options. Spray Paint Text: The liquid nitrogen was applied to the skin utilizing a spray paint frosting technique. Post-Care Instructions: I reviewed with the patient in detail post-care instructions. Patient is to wear sunprotection, and avoid picking at any of the treated lesions. Pt may apply Vaseline to crusted or scabbing areas.

## (undated) DEVICE — DRSG PRIMAPORE 02X3" 7133

## (undated) DEVICE — BAG SPECIMEN NYLON MEMORY WIRE 3"X6" SB1036

## (undated) DEVICE — PREP CHLORAPREP 26ML TINTED ORANGE  260815

## (undated) DEVICE — ENDO TROCAR 12MM VERSAONE BLADELESS W/STD FIX CAN NONB12STF

## (undated) DEVICE — SUCTION MANIFOLD DORNOCH ULTRA CART UL-CL500

## (undated) DEVICE — SOL WATER IRRIG 1000ML BOTTLE 2F7114

## (undated) DEVICE — LIGHT HANDLE X1 31140133

## (undated) DEVICE — ENDO CANNULA 05MM VERSAONE UNIVERSAL UNVCA5STF

## (undated) DEVICE — ANTIFOG SOLUTION W/FOAM PAD 31142527

## (undated) DEVICE — NDL INSUFFLATION 120MM VERRES 172015

## (undated) DEVICE — ESU PENCIL W/COATED BLADE E2450H

## (undated) DEVICE — SU VICRYL 0 TIE 54" J608H

## (undated) DEVICE — ENDO TROCAR OPTICAL 05MM VERSAPORT PLUS W/FIX CAN ONB5STF

## (undated) DEVICE — STPL ENDO RELOAD GIA 45X3.5MM ROTIC 030455

## (undated) DEVICE — DRSG STERI STRIP 1/2X4" R1547

## (undated) DEVICE — ESU GROUND PAD ADULT W/CORD E7507

## (undated) DEVICE — STPL ENDO RELOAD 45X2.5MM ROTIC 030454

## (undated) DEVICE — LINEN TOWEL PACK X5 5464

## (undated) DEVICE — WIPES FOLEY CARE SURESTEP PROVON DFC100

## (undated) DEVICE — STPL ENDO HANDLE GIA ULTRA UNIVERSAL STD EGIAUSTND

## (undated) DEVICE — SOL WATER IRRIG 1000ML BOTTLE 07139-09

## (undated) DEVICE — Device

## (undated) DEVICE — ENDO SHEARS 5MM 176643

## (undated) DEVICE — DEVICE SUTURE GRASPER TROCAR CLOSURE 14GA PMITCSG

## (undated) DEVICE — LINEN TOWEL PACK X6 WHITE 5487

## (undated) RX ORDER — DEXAMETHASONE SODIUM PHOSPHATE 4 MG/ML
INJECTION, SOLUTION INTRA-ARTICULAR; INTRALESIONAL; INTRAMUSCULAR; INTRAVENOUS; SOFT TISSUE
Status: DISPENSED
Start: 2018-03-14

## (undated) RX ORDER — PHENYLEPHRINE HCL IN 0.9% NACL 1 MG/10 ML
SYRINGE (ML) INTRAVENOUS
Status: DISPENSED
Start: 2018-03-14

## (undated) RX ORDER — EPHEDRINE SULFATE 50 MG/ML
INJECTION, SOLUTION INTRAMUSCULAR; INTRAVENOUS; SUBCUTANEOUS
Status: DISPENSED
Start: 2018-03-14

## (undated) RX ORDER — ONDANSETRON 2 MG/ML
INJECTION INTRAMUSCULAR; INTRAVENOUS
Status: DISPENSED
Start: 2018-03-14

## (undated) RX ORDER — PROPOFOL 10 MG/ML
INJECTION, EMULSION INTRAVENOUS
Status: DISPENSED
Start: 2018-03-14

## (undated) RX ORDER — BUPIVACAINE HYDROCHLORIDE 5 MG/ML
INJECTION, SOLUTION EPIDURAL; INTRACAUDAL
Status: DISPENSED
Start: 2018-03-14

## (undated) RX ORDER — LIDOCAINE HYDROCHLORIDE 10 MG/ML
INJECTION, SOLUTION EPIDURAL; INFILTRATION; INTRACAUDAL; PERINEURAL
Status: DISPENSED
Start: 2018-03-14

## (undated) RX ORDER — CIPROFLOXACIN 2 MG/ML
INJECTION, SOLUTION INTRAVENOUS
Status: DISPENSED
Start: 2018-03-14

## (undated) RX ORDER — FENTANYL CITRATE 50 UG/ML
INJECTION, SOLUTION INTRAMUSCULAR; INTRAVENOUS
Status: DISPENSED
Start: 2018-03-14